# Patient Record
Sex: FEMALE | Race: WHITE | URBAN - METROPOLITAN AREA
[De-identification: names, ages, dates, MRNs, and addresses within clinical notes are randomized per-mention and may not be internally consistent; named-entity substitution may affect disease eponyms.]

---

## 2022-12-12 ENCOUNTER — OFFICE VISIT (OUTPATIENT)
Dept: URGENT CARE | Facility: CLINIC | Age: 52
End: 2022-12-12
Payer: MEDICAID

## 2022-12-12 VITALS
HEART RATE: 87 BPM | DIASTOLIC BLOOD PRESSURE: 76 MMHG | SYSTOLIC BLOOD PRESSURE: 110 MMHG | TEMPERATURE: 98 F | HEIGHT: 63 IN | RESPIRATION RATE: 18 BRPM | BODY MASS INDEX: 33.66 KG/M2 | WEIGHT: 190 LBS | OXYGEN SATURATION: 96 %

## 2022-12-12 DIAGNOSIS — R05.9 COUGH, UNSPECIFIED TYPE: ICD-10-CM

## 2022-12-12 DIAGNOSIS — J20.9 ACUTE BRONCHITIS, UNSPECIFIED ORGANISM: Primary | ICD-10-CM

## 2022-12-12 PROCEDURE — 99203 OFFICE O/P NEW LOW 30 MIN: CPT | Mod: S$GLB,,,

## 2022-12-12 PROCEDURE — 1159F PR MEDICATION LIST DOCUMENTED IN MEDICAL RECORD: ICD-10-PCS | Mod: CPTII,S$GLB,,

## 2022-12-12 PROCEDURE — 1160F PR REVIEW ALL MEDS BY PRESCRIBER/CLIN PHARMACIST DOCUMENTED: ICD-10-PCS | Mod: CPTII,S$GLB,,

## 2022-12-12 PROCEDURE — 71046 X-RAY EXAM CHEST 2 VIEWS: CPT | Mod: S$GLB,,, | Performed by: RADIOLOGY

## 2022-12-12 PROCEDURE — 3074F PR MOST RECENT SYSTOLIC BLOOD PRESSURE < 130 MM HG: ICD-10-PCS | Mod: CPTII,S$GLB,,

## 2022-12-12 PROCEDURE — 3078F PR MOST RECENT DIASTOLIC BLOOD PRESSURE < 80 MM HG: ICD-10-PCS | Mod: CPTII,S$GLB,,

## 2022-12-12 PROCEDURE — 1160F RVW MEDS BY RX/DR IN RCRD: CPT | Mod: CPTII,S$GLB,,

## 2022-12-12 PROCEDURE — 3078F DIAST BP <80 MM HG: CPT | Mod: CPTII,S$GLB,,

## 2022-12-12 PROCEDURE — 71046 XR CHEST PA AND LATERAL: ICD-10-PCS | Mod: S$GLB,,, | Performed by: RADIOLOGY

## 2022-12-12 PROCEDURE — 3074F SYST BP LT 130 MM HG: CPT | Mod: CPTII,S$GLB,,

## 2022-12-12 PROCEDURE — 99203 PR OFFICE/OUTPT VISIT, NEW, LEVL III, 30-44 MIN: ICD-10-PCS | Mod: S$GLB,,,

## 2022-12-12 PROCEDURE — 3008F BODY MASS INDEX DOCD: CPT | Mod: CPTII,S$GLB,,

## 2022-12-12 PROCEDURE — 1159F MED LIST DOCD IN RCRD: CPT | Mod: CPTII,S$GLB,,

## 2022-12-12 PROCEDURE — 3008F PR BODY MASS INDEX (BMI) DOCUMENTED: ICD-10-PCS | Mod: CPTII,S$GLB,,

## 2022-12-12 RX ORDER — AZITHROMYCIN 250 MG/1
TABLET, FILM COATED ORAL
Qty: 6 TABLET | Refills: 0 | Status: SHIPPED | OUTPATIENT
Start: 2022-12-12 | End: 2022-12-17

## 2022-12-12 RX ORDER — BENZONATATE 100 MG/1
100 CAPSULE ORAL 3 TIMES DAILY PRN
Qty: 30 CAPSULE | Refills: 0 | Status: SHIPPED | OUTPATIENT
Start: 2022-12-12 | End: 2022-12-22

## 2022-12-12 RX ORDER — PREDNISONE 20 MG/1
20 TABLET ORAL DAILY
Qty: 4 TABLET | Refills: 0 | Status: SHIPPED | OUTPATIENT
Start: 2022-12-12 | End: 2022-12-16

## 2022-12-12 NOTE — PROGRESS NOTES
"Subjective:       Patient ID: Danyelle Garcia is a 52 y.o. female.    Vitals:  height is 5' 3" (1.6 m) and weight is 86.2 kg (190 lb). Her tympanic temperature is 97.8 °F (36.6 °C). Her blood pressure is 110/76 and her pulse is 87. Her respiration is 18 and oxygen saturation is 96%.     Chief Complaint: Cough    52-year-old female presents complains of a dry cough, congestion that has been going on since November 28.  Patient states that she was on a trip in [place] from November 24, 2028 and after returning she began having a dry cough.  She is been taking NyQuil, drinking tea and taking over-the-counter cough suppressants without any significant relief.  No chest pain, shortness a breath, fever, chills, nausea or vomiting    Cough  This is a new problem. The current episode started 1 to 4 weeks ago. The problem has been unchanged. The problem occurs every few minutes. The cough is Non-productive. Pertinent negatives include no chest pain, chills, fever, sore throat or shortness of breath. Nothing aggravates the symptoms. She has tried OTC cough suppressant for the symptoms. The treatment provided mild relief. There is no history of asthma, bronchiectasis, bronchitis, COPD, emphysema, environmental allergies or pneumonia.     Constitution: Negative for chills, sweating, fatigue and fever.   HENT:  Positive for congestion. Negative for sinus pain, sinus pressure and sore throat.    Cardiovascular:  Negative for chest pain and sob on exertion.   Respiratory:  Positive for cough. Negative for sputum production and shortness of breath.    Allergic/Immunologic: Negative for environmental allergies.     Objective:      Physical Exam   Constitutional: She is oriented to person, place, and time. She appears well-developed. She is cooperative.  Non-toxic appearance. She does not appear ill. No distress.   HENT:   Head: Normocephalic and atraumatic.   Ears:   Right Ear: Hearing, tympanic membrane, external ear and ear canal " normal.   Left Ear: Hearing, tympanic membrane, external ear and ear canal normal.   Nose: Nose normal. No mucosal edema, rhinorrhea or nasal deformity. No epistaxis. Right sinus exhibits no maxillary sinus tenderness and no frontal sinus tenderness. Left sinus exhibits no maxillary sinus tenderness and no frontal sinus tenderness.   Mouth/Throat: Uvula is midline, oropharynx is clear and moist and mucous membranes are normal. No trismus in the jaw. Normal dentition. No uvula swelling. No posterior oropharyngeal erythema.   Eyes: Conjunctivae, EOM and lids are normal. Pupils are equal, round, and reactive to light. Right eye exhibits no discharge. Left eye exhibits no discharge. No scleral icterus.   Neck: Trachea normal and phonation normal. Neck supple.   Cardiovascular: Normal rate, regular rhythm, normal heart sounds and normal pulses.   Pulmonary/Chest: Effort normal and breath sounds normal. No respiratory distress.   Abdominal: Normal appearance and bowel sounds are normal. She exhibits no distension and no mass. Soft. There is no abdominal tenderness.   Musculoskeletal: Normal range of motion.         General: No deformity. Normal range of motion.   Neurological: She is alert and oriented to person, place, and time. She exhibits normal muscle tone. Coordination normal.   Skin: Skin is warm, dry, intact, not diaphoretic and not pale.   Psychiatric: Her speech is normal and behavior is normal. Judgment and thought content normal.   Nursing note and vitals reviewed.      Native chest x-ray personally read by me.  Patient discharged prior to final chest x-ray read.    Assessment:       1. Acute bronchitis, unspecified organism    2. Cough, unspecified type          Plan:       Discussed negative chest xray results with patient. No testing done today as symptoms have been present for 7 days. Discussed diagnosis of bronchitis with patient as a cough for 7 days or more in the absence of wheezing, fever and chest  xray showing evidence of pneumonia. Will treat symptoms with prednisone for inflammation, cough suppressants, anti histamine and flonase. Also prescribed zpack. Patient should continue taking mucinex. Return to clinic with any new or worsening symptoms. Patient's vitals are stable, she is in no acute distress.     Acute bronchitis, unspecified organism  -     azithromycin (Z-DELMI) 250 MG tablet; Take 2 tablets by mouth on day 1; Take 1 tablet by mouth on days 2-5  Dispense: 6 tablet; Refill: 0  -     predniSONE (DELTASONE) 20 MG tablet; Take 1 tablet (20 mg total) by mouth once daily. for 4 days  Dispense: 4 tablet; Refill: 0  -     benzonatate (TESSALON) 100 MG capsule; Take 1 capsule (100 mg total) by mouth 3 (three) times daily as needed for Cough.  Dispense: 30 capsule; Refill: 0    Cough, unspecified type  -     XR CHEST PA AND LATERAL; Future; Expected date: 12/12/2022

## 2023-02-16 ENCOUNTER — OFFICE VISIT (OUTPATIENT)
Dept: URGENT CARE | Facility: CLINIC | Age: 53
End: 2023-02-16
Payer: MEDICAID

## 2023-02-16 VITALS
OXYGEN SATURATION: 97 % | BODY MASS INDEX: 32.78 KG/M2 | SYSTOLIC BLOOD PRESSURE: 121 MMHG | DIASTOLIC BLOOD PRESSURE: 82 MMHG | HEART RATE: 86 BPM | HEIGHT: 63 IN | RESPIRATION RATE: 16 BRPM | WEIGHT: 185 LBS | TEMPERATURE: 100 F

## 2023-02-16 DIAGNOSIS — U07.1 COVID-19 VIRUS INFECTION: Primary | ICD-10-CM

## 2023-02-16 DIAGNOSIS — R05.9 COUGH, UNSPECIFIED TYPE: ICD-10-CM

## 2023-02-16 LAB
CTP QC/QA: YES
SARS-COV-2 AG RESP QL IA.RAPID: POSITIVE

## 2023-02-16 PROCEDURE — 3008F BODY MASS INDEX DOCD: CPT | Mod: CPTII,S$GLB,, | Performed by: EMERGENCY MEDICINE

## 2023-02-16 PROCEDURE — 1159F PR MEDICATION LIST DOCUMENTED IN MEDICAL RECORD: ICD-10-PCS | Mod: CPTII,S$GLB,, | Performed by: EMERGENCY MEDICINE

## 2023-02-16 PROCEDURE — 3074F PR MOST RECENT SYSTOLIC BLOOD PRESSURE < 130 MM HG: ICD-10-PCS | Mod: CPTII,S$GLB,, | Performed by: EMERGENCY MEDICINE

## 2023-02-16 PROCEDURE — 87811 SARS CORONAVIRUS 2 ANTIGEN POCT, MANUAL READ: ICD-10-PCS | Mod: QW,S$GLB,, | Performed by: EMERGENCY MEDICINE

## 2023-02-16 PROCEDURE — 3008F PR BODY MASS INDEX (BMI) DOCUMENTED: ICD-10-PCS | Mod: CPTII,S$GLB,, | Performed by: EMERGENCY MEDICINE

## 2023-02-16 PROCEDURE — 3074F SYST BP LT 130 MM HG: CPT | Mod: CPTII,S$GLB,, | Performed by: EMERGENCY MEDICINE

## 2023-02-16 PROCEDURE — 3079F DIAST BP 80-89 MM HG: CPT | Mod: CPTII,S$GLB,, | Performed by: EMERGENCY MEDICINE

## 2023-02-16 PROCEDURE — 99214 PR OFFICE/OUTPT VISIT, EST, LEVL IV, 30-39 MIN: ICD-10-PCS | Mod: CR,S$GLB,, | Performed by: EMERGENCY MEDICINE

## 2023-02-16 PROCEDURE — 87811 SARS-COV-2 COVID19 W/OPTIC: CPT | Mod: QW,S$GLB,, | Performed by: EMERGENCY MEDICINE

## 2023-02-16 PROCEDURE — 3079F PR MOST RECENT DIASTOLIC BLOOD PRESSURE 80-89 MM HG: ICD-10-PCS | Mod: CPTII,S$GLB,, | Performed by: EMERGENCY MEDICINE

## 2023-02-16 PROCEDURE — 1159F MED LIST DOCD IN RCRD: CPT | Mod: CPTII,S$GLB,, | Performed by: EMERGENCY MEDICINE

## 2023-02-16 PROCEDURE — 99214 OFFICE O/P EST MOD 30 MIN: CPT | Mod: CR,S$GLB,, | Performed by: EMERGENCY MEDICINE

## 2023-02-16 RX ORDER — BROMPHENIRAMINE MALEATE, PSEUDOEPHEDRINE HYDROCHLORIDE, AND DEXTROMETHORPHAN HYDROBROMIDE 2; 30; 10 MG/5ML; MG/5ML; MG/5ML
10 SYRUP ORAL EVERY 6 HOURS PRN
Qty: 200 ML | Refills: 0 | Status: SHIPPED | OUTPATIENT
Start: 2023-02-16 | End: 2023-02-26

## 2023-02-16 NOTE — PROGRESS NOTES
"Subjective:       Patient ID: Danyelle Garcia is a 52 y.o. female.    Vitals:  height is 5' 3" (1.6 m) and weight is 83.9 kg (185 lb). Her tympanic temperature is 99.5 °F (37.5 °C). Her blood pressure is 121/82 and her pulse is 86. Her respiration is 16 and oxygen saturation is 97%.     Chief Complaint: Cough    Pt is here today for cough and headache X 2 days  Pt is taking Tylenol and Nyquil    PATIENT IS A 52-YEAR-OLD FEMALE WITH NO SIGNIFICANT PAST MEDICAL HISTORY AND IS NOT ON MEDICATIONS BY PRESCRIPTION WITH 2 DAYS ONSET OF HEADACHE, FEVERS CHILLS, CONGESTION AND RESOLVED SORE THROAT.  SHE DOES REPORT A COUGH WITHOUT SHORTNESS OF BREATH.  EXAM SHOWS NORMAL VITAL SIGNS WITH NO WHEEZING NO INCREASED WORK OF BREATHING.  COVID SWAB POSITIVE.  DISCUSSED WITH HER SUPPORTIVE CARE INCLUDING FLUIDS, TYLENOL IBUPROFEN SYMPTOMATIC RELIEF OF CONGESTION RUNNY NOSE POSTNASAL DRIP AND COUGH WITH BROMFED DM PRESCRIPTION AND HER OPTIONS WITH PAXLOVID.  SHE HAS REQUESTED THE PAXLOVID AND WILL PRESCRIBE.    Cough  The current episode started in the past 7 days. The problem has been unchanged. The problem occurs constantly. The cough is Productive of sputum. Associated symptoms include headaches. Pertinent negatives include no chest pain, chills, ear pain, fever, rash, rhinorrhea, sore throat or shortness of breath. Nothing aggravates the symptoms. Treatments tried: Tylenol.     ROS    Constitution: Negative for chills, fatigue and fever.   HENT:  Negative for ear pain, sinus pain and sore throat.    Neck: Negative for neck pain and neck stiffness.   Cardiovascular:  Negative for chest pain, palpitations and sob on exertion.   Eyes:  Negative for eye pain and vision loss.   Respiratory:  Positive for cough. Negative for shortness of breath and asthma.    Gastrointestinal:  Negative for abdominal pain, nausea, vomiting and diarrhea.   Genitourinary:  Negative for dysuria, frequency and hematuria.   Musculoskeletal:  Negative for " pain, abnormal ROM of joint and back pain.   Skin:  Negative for rash and wound.   Allergic/Immunologic: Negative for seasonal allergies and asthma.   Neurological:  Positive for headaches. Negative for dizziness, light-headedness and altered mental status.   Psychiatric/Behavioral:  Negative for altered mental status and confusion.      Objective:      Physical Exam   Constitutional: She is oriented to person, place, and time. She appears well-developed. She is cooperative.  Non-toxic appearance. She does not appear ill. No distress.   HENT:   Head: Normocephalic and atraumatic.   Ears:   Right Ear: Hearing, tympanic membrane, external ear and ear canal normal.   Left Ear: Hearing, tympanic membrane, external ear and ear canal normal.   Nose: Congestion present. No mucosal edema, rhinorrhea or nasal deformity. No epistaxis. Right sinus exhibits no maxillary sinus tenderness and no frontal sinus tenderness. Left sinus exhibits no maxillary sinus tenderness and no frontal sinus tenderness.   Mouth/Throat: Uvula is midline, oropharynx is clear and moist and mucous membranes are normal. No trismus in the jaw. Normal dentition. No uvula swelling. No oropharyngeal exudate, posterior oropharyngeal edema or posterior oropharyngeal erythema.   Eyes: Conjunctivae and lids are normal. No scleral icterus.   Neck: Trachea normal and phonation normal. Neck supple. No edema present. No erythema present. No neck rigidity present.   Cardiovascular: Normal rate, regular rhythm, normal heart sounds and normal pulses.   Pulmonary/Chest: Effort normal and breath sounds normal. No respiratory distress. She has no decreased breath sounds. She has no rhonchi.   Abdominal: Normal appearance.   Musculoskeletal: Normal range of motion.         General: No deformity. Normal range of motion.   Neurological: She is alert and oriented to person, place, and time. She exhibits normal muscle tone. Coordination normal.   Skin: Skin is warm, dry,  intact, not diaphoretic and not pale.   Psychiatric: Her speech is normal and behavior is normal. Judgment and thought content normal.   Nursing note and vitals reviewed.       Results for orders placed or performed in visit on 02/16/23   SARS Coronavirus 2 Antigen, POCT Manual Read   Result Value Ref Range    SARS Coronavirus 2 Antigen Positive (A) Negative     Acceptable Yes          Assessment:       1. COVID-19 virus infection    2. Cough, unspecified type          Plan:         COVID-19 virus infection    Cough, unspecified type  -     SARS Coronavirus 2 Antigen, POCT Manual Read    Other orders  -     nirmatrelvir-ritonavir 300 mg (150 mg x 2)-100 mg copackaged tablets (EUA); Take 3 tablets by mouth 2 (two) times daily for 5 days. Each dose contains 2 nirmatrelvir (pink tablets) and 1 ritonavir (white tablet). Take all 3 tablets together  Dispense: 30 tablet; Refill: 0  -     brompheniramine-pseudoeph-DM (BROMFED DM) 2-30-10 mg/5 mL Syrp; Take 10 mLs by mouth every 6 (six) hours as needed (CONGESTION/COUGH).  Dispense: 200 mL; Refill: 0         Patient Instructions   Paxlovid prescription   Bromfed DM prescription   Rest and hydrate with plenty of fluids  Tylenol and ibuprofen for any fever body aches or headache  COVID swab positive   See COVID discharge instruction sheet    Return for any concerns or problems including shortness of breath, high fevers that does not come down with Tylenol or ibuprofen, worsening of current condition despite treatment regimen.      Follow-up with PCP

## 2023-02-16 NOTE — PATIENT INSTRUCTIONS
Paxlovid prescription   Bromfed DM prescription   Rest and hydrate with plenty of fluids  Tylenol and ibuprofen for any fever body aches or headache  COVID swab positive   See COVID discharge instruction sheet    Return for any concerns or problems including shortness of breath, high fevers that does not come down with Tylenol or ibuprofen, worsening of current condition despite treatment regimen.      Follow-up with PCP

## 2023-02-16 NOTE — LETTER
[address], [address]  [place] [address]  Phone: [phone]  Fax: [phone]          Return to Work/School    Patient: Danyelle Garcia  YOB: 1970   Date: 02/16/2023     To Whom It May Concern:     Danyelle Garcia was in contact with/seen in my office on 02/16/2023. COVID-19 is present in our communities across the state. There is limited testing for COVID at this time, so not all patients can be tested. In this situation, your employee meets the following criteria:     Danyelle Garcia has met the criteria for COVID-19 testing and has a POSITIVE result. She can return to work once they are asymptomatic for 24 hours without the use of fever reducing medications AND at least five days from the start of symptoms (or from the first positive result if they have no symptoms).      If you have any questions or concerns, or if I can be of further assistance, please do not hesitate to contact me.     Sincerely,      Linden Cyr MD

## 2023-03-06 ENCOUNTER — OFFICE VISIT (OUTPATIENT)
Dept: URGENT CARE | Facility: CLINIC | Age: 53
End: 2023-03-06
Payer: MEDICAID

## 2023-03-06 VITALS
HEIGHT: 63 IN | HEART RATE: 96 BPM | BODY MASS INDEX: 32.78 KG/M2 | RESPIRATION RATE: 16 BRPM | DIASTOLIC BLOOD PRESSURE: 85 MMHG | SYSTOLIC BLOOD PRESSURE: 126 MMHG | WEIGHT: 185 LBS | OXYGEN SATURATION: 98 % | TEMPERATURE: 99 F

## 2023-03-06 DIAGNOSIS — M77.32 CALCANEAL SPUR OF LEFT FOOT: ICD-10-CM

## 2023-03-06 DIAGNOSIS — Z76.89 ENCOUNTER TO ESTABLISH CARE: ICD-10-CM

## 2023-03-06 DIAGNOSIS — S93.402A MODERATE LEFT ANKLE SPRAIN, INITIAL ENCOUNTER: Primary | ICD-10-CM

## 2023-03-06 LAB
B-HCG UR QL: NEGATIVE
CTP QC/QA: YES

## 2023-03-06 PROCEDURE — 81025 URINE PREGNANCY TEST: CPT | Mod: S$GLB,,, | Performed by: PHYSICIAN ASSISTANT

## 2023-03-06 PROCEDURE — 1160F PR REVIEW ALL MEDS BY PRESCRIBER/CLIN PHARMACIST DOCUMENTED: ICD-10-PCS | Mod: CPTII,S$GLB,, | Performed by: PHYSICIAN ASSISTANT

## 2023-03-06 PROCEDURE — 1159F MED LIST DOCD IN RCRD: CPT | Mod: CPTII,S$GLB,, | Performed by: PHYSICIAN ASSISTANT

## 2023-03-06 PROCEDURE — 3008F BODY MASS INDEX DOCD: CPT | Mod: CPTII,S$GLB,, | Performed by: PHYSICIAN ASSISTANT

## 2023-03-06 PROCEDURE — 3079F DIAST BP 80-89 MM HG: CPT | Mod: CPTII,S$GLB,, | Performed by: PHYSICIAN ASSISTANT

## 2023-03-06 PROCEDURE — 81025 POCT URINE PREGNANCY: ICD-10-PCS | Mod: S$GLB,,, | Performed by: PHYSICIAN ASSISTANT

## 2023-03-06 PROCEDURE — 73610 XR ANKLE COMPLETE 3 VIEW LEFT: ICD-10-PCS | Mod: LT,S$GLB,, | Performed by: RADIOLOGY

## 2023-03-06 PROCEDURE — 1160F RVW MEDS BY RX/DR IN RCRD: CPT | Mod: CPTII,S$GLB,, | Performed by: PHYSICIAN ASSISTANT

## 2023-03-06 PROCEDURE — 73610 X-RAY EXAM OF ANKLE: CPT | Mod: LT,S$GLB,, | Performed by: RADIOLOGY

## 2023-03-06 PROCEDURE — 3074F PR MOST RECENT SYSTOLIC BLOOD PRESSURE < 130 MM HG: ICD-10-PCS | Mod: CPTII,S$GLB,, | Performed by: PHYSICIAN ASSISTANT

## 2023-03-06 PROCEDURE — 3074F SYST BP LT 130 MM HG: CPT | Mod: CPTII,S$GLB,, | Performed by: PHYSICIAN ASSISTANT

## 2023-03-06 PROCEDURE — 1159F PR MEDICATION LIST DOCUMENTED IN MEDICAL RECORD: ICD-10-PCS | Mod: CPTII,S$GLB,, | Performed by: PHYSICIAN ASSISTANT

## 2023-03-06 PROCEDURE — 99213 OFFICE O/P EST LOW 20 MIN: CPT | Mod: S$GLB,,, | Performed by: PHYSICIAN ASSISTANT

## 2023-03-06 PROCEDURE — 99213 PR OFFICE/OUTPT VISIT, EST, LEVL III, 20-29 MIN: ICD-10-PCS | Mod: S$GLB,,, | Performed by: PHYSICIAN ASSISTANT

## 2023-03-06 PROCEDURE — 3079F PR MOST RECENT DIASTOLIC BLOOD PRESSURE 80-89 MM HG: ICD-10-PCS | Mod: CPTII,S$GLB,, | Performed by: PHYSICIAN ASSISTANT

## 2023-03-06 PROCEDURE — 3008F PR BODY MASS INDEX (BMI) DOCUMENTED: ICD-10-PCS | Mod: CPTII,S$GLB,, | Performed by: PHYSICIAN ASSISTANT

## 2023-03-06 NOTE — PATIENT INSTRUCTIONS
Compression with Ace bandage as tolerated.  Elevation of leg.  Ice for 15 minutes at a time 3 to 4 times a day rotate Advil and Tylenol for pain relief.  We will call with the official x-ray results.  This can take several weeks to heal however if symptoms worsen please got to the ER for evaluation. A referral was placed for primary care someone should contact you with information.  If you do not hear from them within a week please call [phone].

## 2023-03-06 NOTE — PROGRESS NOTES
"Subjective:       Patient ID: Danyelle Garcia is a 52 y.o. female.    Vitals:  height is 5' 3" (1.6 m) and weight is 83.9 kg (185 lb). Her temperature is 99.1 °F (37.3 °C). Her blood pressure is 126/85 and her pulse is 96. Her respiration is 16 and oxygen saturation is 98%.     Chief Complaint: Ankle Pain    Pt sts she was walking yesterday and stepped in a hole in the grass and and fell and thinks she twisted her L ankle in the process. Pt sts there is pain and swelling. Pt has taken tylenol with significant relief.     Ankle Pain   The incident occurred 12 to 24 hours ago. The incident occurred in the yard. The injury mechanism was a fall. The pain is at a severity of 7/10. The pain has been Constant since onset. Pertinent negatives include no inability to bear weight, loss of motion, loss of sensation, muscle weakness, numbness or tingling. She reports no foreign bodies present. The symptoms are aggravated by weight bearing. She has tried acetaminophen for the symptoms. The treatment provided significant relief.     Constitution: Negative for appetite change, chills, sweating, fatigue and fever.   Neck: Negative for painful lymph nodes.   Cardiovascular:  Negative for chest pain.   Respiratory:  Negative for shortness of breath.    Gastrointestinal:  Negative for abdominal pain.   Musculoskeletal:  Positive for pain, trauma, joint pain, joint swelling, abnormal ROM of joint and muscle ache. Negative for muscle cramps.   Skin:  Negative for color change, pale, rash and erythema.   Neurological:  Negative for disorientation, altered mental status, numbness, tingling and tremors.   Hematologic/Lymphatic: Negative for swollen lymph nodes.   Psychiatric/Behavioral:  Negative for altered mental status, disorientation, confusion and agitation.    History reviewed. No pertinent past medical history.    History reviewed. No pertinent surgical history.    History reviewed. No pertinent family history.    Social History "     Socioeconomic History    Marital status: Unknown   Tobacco Use    Smoking status: Never    Smokeless tobacco: Never       No current outpatient medications on file.     No current facility-administered medications for this visit.       Review of patient's allergies indicates:  No Known Allergies      Objective:      Physical Exam   Constitutional: She is oriented to person, place, and time.  Non-toxic appearance. She does not appear ill. No distress.   HENT:   Head: Normocephalic and atraumatic.   Cardiovascular: Normal pulses.   Pulses:       Dorsalis pedis pulses are 2+ on the left side.        Posterior tibial pulses are 2+ on the left side.   Pulmonary/Chest: Effort normal. No respiratory distress.   Abdominal: Normal appearance.   Musculoskeletal:         General: Swelling, tenderness and signs of injury present. No deformity.      Right lower leg: No edema.      Left lower leg: Edema present.      Left foot: Decreased range of motion. No deformity. There is effusion present. No bruising, atrophy or scars present. Anterior drawer test: negative. Varus tilt test: positive. Plantar foot sensation: normal.        Feet:    Neurological: She is alert and oriented to person, place, and time. No sensory deficit. Gait abnormal. Coordination normal.   Skin: Skin is warm, dry, not diaphoretic, not pale and no rash. Capillary refill takes less than 2 seconds. No bruising and No erythema   Psychiatric: Her behavior is normal. Mood, judgment and thought content normal.   Nursing note and vitals reviewed.    XR ANKLE COMPLETE 3 VIEW LEFT    Result Date: 3/6/2023  EXAMINATION: XR ANKLE COMPLETE 3 VIEW LEFT CLINICAL HISTORY: Pain in left ankle and joints of left foot TECHNIQUE: AP, lateral and oblique views of the left ankle were performed. COMPARISON: None FINDINGS: The bones are intact.  There is no evidence for acute fracture or bone destruction.  There is no evidence for dislocation.  There is soft tissue swelling  overlying the left lateral malleolus.  Calcaneal spurs are noted at the insertions of the Achilles tendon and plantar fascia.  No radiopaque soft tissue foreign bodies are identified.     No evidence for acute fracture, bone destruction, or dislocation. Soft tissue swelling particularly overlying the lateral malleolus. Calcaneal spurs at the insertions of the Achilles tendon and plantar fascia. Electronically signed by: Wai Luevano MD Date:    03/06/2023 Time:    16:47     Assessment:       1. Moderate left ankle sprain, initial encounter    2. Encounter to establish care    3. Calcaneal spur of left foot          Plan:     Results reviewed- pt notified    Moderate left ankle sprain, initial encounter  -     XR ANKLE COMPLETE 3 VIEW LEFT  -     POCT urine pregnancy  -     BANDAGE ELASTIC 4IN ACE NS    Encounter to establish care  -     Ambulatory referral/consult to Internal Medicine    Calcaneal spur of left foot    - no symptoms at this time pt made aware of signs and symptoms to be aware of.                Patient Instructions   Compression with Ace bandage as tolerated.  Elevation of leg.  Ice for 15 minutes at a time 3 to 4 times a day rotate Advil and Tylenol for pain relief.  We will call with the official x-ray results.  This can take several weeks to heal however if symptoms worsen please got to the ER for evaluation. A referral was placed for primary care someone should contact you with information.  If you do not hear from them within a week please call [phone].

## 2023-04-04 ENCOUNTER — OFFICE VISIT (OUTPATIENT)
Dept: URGENT CARE | Facility: CLINIC | Age: 53
End: 2023-04-04
Payer: MEDICAID

## 2023-04-04 VITALS
BODY MASS INDEX: 32.78 KG/M2 | SYSTOLIC BLOOD PRESSURE: 114 MMHG | TEMPERATURE: 98 F | RESPIRATION RATE: 18 BRPM | OXYGEN SATURATION: 95 % | HEART RATE: 83 BPM | DIASTOLIC BLOOD PRESSURE: 73 MMHG | HEIGHT: 63 IN | WEIGHT: 185 LBS

## 2023-04-04 DIAGNOSIS — M77.12 LEFT LATERAL EPICONDYLITIS: Primary | ICD-10-CM

## 2023-04-04 DIAGNOSIS — M25.522 ELBOW PAIN, LEFT: ICD-10-CM

## 2023-04-04 PROCEDURE — 73080 X-RAY EXAM OF ELBOW: CPT | Mod: LT,S$GLB,, | Performed by: RADIOLOGY

## 2023-04-04 PROCEDURE — 99214 OFFICE O/P EST MOD 30 MIN: CPT | Mod: S$GLB,,, | Performed by: EMERGENCY MEDICINE

## 2023-04-04 PROCEDURE — 99214 PR OFFICE/OUTPT VISIT, EST, LEVL IV, 30-39 MIN: ICD-10-PCS | Mod: S$GLB,,, | Performed by: EMERGENCY MEDICINE

## 2023-04-04 PROCEDURE — 73080 XR ELBOW COMPLETE 3 VIEW LEFT: ICD-10-PCS | Mod: LT,S$GLB,, | Performed by: RADIOLOGY

## 2023-04-04 RX ORDER — METHYLPREDNISOLONE 4 MG/1
TABLET ORAL
Qty: 21 EACH | Refills: 0 | Status: SHIPPED | OUTPATIENT
Start: 2023-04-04 | End: 2023-04-25

## 2023-04-04 NOTE — PATIENT INSTRUCTIONS
Tennis Elbow Brace from the [facility]  Xray negative for bony injury  Medrol dose pack Rx  Rest and Ice sore area  See lateral Epicondylitis sheet  Follow up with PCP/Orthopedics if not very much improved in 1-2 weeks.  After Medrol dose pack Rx finished, ok to take tylenol and/or advil.  Return for any concerns or problems

## 2023-04-04 NOTE — PROGRESS NOTES
"Subjective:      Patient ID: Danyelle Garcia is a 52 y.o. female.    Vitals:  height is 5' 3" (1.6 m) and weight is 83.9 kg (185 lb). Her tympanic temperature is 97.8 °F (36.6 °C). Her blood pressure is 114/73 and her pulse is 83. Her respiration is 18 and oxygen saturation is 95%.     Chief Complaint: Elbow Pain    Pt complains of left elbow that occurred from a injury that happened 1 month ago. Pain is described as aching during certain movements and positions. Tylenol was taken for treatment with mild relief.    Patient is a 52-year-old female who sustained a fall 1 month ago and had a moderate injury to the left ankle which has since resolved at that time she did state that she hit her left elbow.  Since then she is been having intermittent pain to the left lateral elbow.  Worse with pronation and supination and palpation of the lateral epicondyle.  She started to work out and states it did not improve.  Physical exam shows symptoms and signs of lateral epicondylitis and will treat as such.  X-ray negative.  Encouraged rest, ice, elevation, tennis elbow brace and will prescribe Medrol Dosepak followed by NSAID or Tylenol.  Encouraged her to follow up with the primary care physician or Orthopedics if not significantly improved or resolved.      Elbow Injury  This is a new problem. The current episode started more than 1 month ago. The problem occurs constantly. The problem has been unchanged. Associated symptoms include arthralgias. Pertinent negatives include no abdominal pain, chest pain, chills, coughing, fatigue, fever, joint swelling, nausea, neck pain, numbness, rash, sore throat or vomiting. The symptoms are aggravated by bending and twisting. She has tried acetaminophen for the symptoms. The treatment provided mild relief.     ROS    Constitution: Negative for chills, fatigue and fever.   HENT:  Negative for ear pain, sinus pain and sore throat.    Neck: Negative for neck pain and neck stiffness. "   Cardiovascular:  Negative for chest pain, palpitations and sob on exertion.   Eyes:  Negative for eye pain and vision loss.   Respiratory:  Negative for cough, shortness of breath and asthma.    Gastrointestinal:  Negative for abdominal pain, nausea, vomiting and diarrhea.   Genitourinary:  Negative for dysuria, frequency and hematuria.   Musculoskeletal:  Positive for trauma and joint pain. Negative for pain, joint swelling, abnormal ROM of joint and back pain.   Skin:  Negative for rash, wound and erythema.   Allergic/Immunologic: Negative for seasonal allergies and asthma.   Neurological:  Negative for dizziness, light-headedness, altered mental status, numbness and tingling.   Psychiatric/Behavioral:  Negative for altered mental status and confusion.     Objective:     Physical Exam   Constitutional: She is oriented to person, place, and time. She appears well-developed.   HENT:   Head: Normocephalic and atraumatic. Head is without abrasion, without contusion and without laceration.   Ears:   Right Ear: External ear normal.   Left Ear: External ear normal.   Nose: Nose normal.   Mouth/Throat: Oropharynx is clear and moist and mucous membranes are normal.   Eyes: Conjunctivae, EOM and lids are normal. Pupils are equal, round, and reactive to light.   Neck: Trachea normal and phonation normal. Neck supple.   Cardiovascular: Normal rate, regular rhythm and normal heart sounds.   Pulmonary/Chest: Effort normal and breath sounds normal. No stridor. No respiratory distress.   Musculoskeletal:         General: Tenderness and signs of injury present. No swelling.      Comments: LEFT ELBOW: PAIN WITH PRONATION AND SUPINATION AT THE LATERAL EPICONDYLE.  NO SWELLING NO BRUISING NO EDEMA.  DISTALLY NEUROVASCULARLY INTACT.  CONSISTENT WITH LATERAL EPICONDYLITIS OR TENNIS ELBOW.   Neurological: She is alert and oriented to person, place, and time.   Skin: Skin is warm, dry, intact and no rash. Capillary refill takes less  than 2 seconds. No abrasion, No burn, No bruising, No erythema and No ecchymosis   Psychiatric: Her speech is normal and behavior is normal. Judgment and thought content normal.   Nursing note and vitals reviewed.       XRAY LEFT ELBOW NEGATIVE     Assessment:     1. Left lateral epicondylitis    2. Elbow pain, left        Plan:       Left lateral epicondylitis  -     X-Ray Elbow Complete Left; Future; Expected date: 04/04/2023    Elbow pain, left    Other orders  -     methylPREDNISolone (MEDROL DOSEPACK) 4 mg tablet; use as directed  Dispense: 21 each; Refill: 0      Patient Instructions   Tennis Elbow Brace from the drugstore  Xray negative for bony injury  Medrol dose pack Rx  Rest and Ice sore area  See lateral Epicondylitis sheet  Follow up with PCP/Orthopedics if not very much improved in 1-2 weeks.  After Medrol dose pack Rx finished, ok to take tylenol and/or advil.  Return for any concerns or problems

## 2023-05-21 ENCOUNTER — OFFICE VISIT (OUTPATIENT)
Dept: URGENT CARE | Facility: CLINIC | Age: 53
End: 2023-05-21
Payer: COMMERCIAL

## 2023-05-21 VITALS
BODY MASS INDEX: 32.78 KG/M2 | RESPIRATION RATE: 19 BRPM | HEART RATE: 77 BPM | WEIGHT: 185 LBS | OXYGEN SATURATION: 97 % | DIASTOLIC BLOOD PRESSURE: 80 MMHG | TEMPERATURE: 98 F | HEIGHT: 63 IN | SYSTOLIC BLOOD PRESSURE: 122 MMHG

## 2023-05-21 DIAGNOSIS — H60.312 ACUTE DIFFUSE OTITIS EXTERNA OF LEFT EAR: Primary | ICD-10-CM

## 2023-05-21 DIAGNOSIS — H92.02 LEFT EAR PAIN: ICD-10-CM

## 2023-05-21 PROBLEM — R07.0 PAIN IN THROAT: Status: ACTIVE | Noted: 2020-02-21

## 2023-05-21 PROBLEM — J01.90 ACUTE SINUSITIS: Status: ACTIVE | Noted: 2020-02-21

## 2023-05-21 PROCEDURE — 99213 OFFICE O/P EST LOW 20 MIN: CPT | Mod: S$GLB,,, | Performed by: FAMILY MEDICINE

## 2023-05-21 PROCEDURE — 99213 PR OFFICE/OUTPT VISIT, EST, LEVL III, 20-29 MIN: ICD-10-PCS | Mod: S$GLB,,, | Performed by: FAMILY MEDICINE

## 2023-05-21 RX ORDER — IBUPROFEN 600 MG/1
600 TABLET ORAL EVERY 8 HOURS PRN
Qty: 30 TABLET | Refills: 0 | Status: SHIPPED | OUTPATIENT
Start: 2023-05-21 | End: 2023-05-31

## 2023-05-21 RX ORDER — AMOXICILLIN AND CLAVULANATE POTASSIUM 875; 125 MG/1; MG/1
1 TABLET, FILM COATED ORAL EVERY 12 HOURS
Qty: 20 TABLET | Refills: 0 | Status: SHIPPED | OUTPATIENT
Start: 2023-05-21 | End: 2023-05-31

## 2023-05-21 RX ORDER — CIPROFLOXACIN AND DEXAMETHASONE 3; 1 MG/ML; MG/ML
4 SUSPENSION/ DROPS AURICULAR (OTIC) 2 TIMES DAILY
Qty: 7.5 ML | Refills: 0 | Status: SHIPPED | OUTPATIENT
Start: 2023-05-21 | End: 2023-05-26

## 2023-05-21 NOTE — PROGRESS NOTES
"Subjective:      Patient ID: Danyelle Garcia is a 52 y.o. female.    Vitals:  height is 5' 3" (1.6 m) and weight is 83.9 kg (185 lb). Her tympanic temperature is 97.6 °F (36.4 °C). Her blood pressure is 122/80 and her pulse is 77. Her respiration is 19 and oxygen saturation is 97%.     Chief Complaint: Otalgia    Pt states that she is having left ear pain that started 3 days ago. Pain level is 7/8 . Pt states that she has a itching , sharp pains that come and go .  Provider note begins below:  Pt denies any pmh, she started with left ear pain 3 days ago. Describes as a throbbing and sharp pain. Denies similar pain in the past. She tried tylenol over the past 3 days, but woke up this morning and the pain was worse, denies any fever, chills or rashes. Denies recent URI symptoms or hearing changes. Says it is either itching, throbbing, "feels like something going in there." Denies any recent swimming. Referred to est with pcp in march, she has not done this yet.     Otalgia   There is pain in the left ear. This is a new problem. The current episode started in the past 7 days. The problem occurs constantly. The problem has been unchanged. There has been no fever. The pain is severe. Pertinent negatives include no abdominal pain, coughing, diarrhea, ear discharge, headaches, hearing loss, neck pain, rash, rhinorrhea, sore throat or vomiting. She has tried nothing for the symptoms. There is no history of a chronic ear infection, hearing loss or a tympanostomy tube.     Constitution: Negative for activity change, appetite change, chills, sweating, fatigue, fever, unexpected weight change and generalized weakness.   HENT:  Positive for ear pain. Negative for ear discharge, foreign body in ear, tinnitus, hearing loss and sore throat.    Neck: Negative for neck pain.   Respiratory:  Negative for cough.    Gastrointestinal:  Negative for abdominal pain, vomiting and diarrhea.   Skin:  Negative for rash.   Neurological:  Negative " for headaches.    Objective:     Physical Exam   Constitutional: She is oriented to person, place, and time. She appears well-developed.  Non-toxic appearance. She does not appear ill. No distress.   HENT:   Head: Normocephalic and atraumatic.   Ears:   Right Ear: Hearing, tympanic membrane, external ear and ear canal normal. No mastoid tenderness. Tympanic membrane is not perforated, not erythematous, not retracted and not bulging.   Left Ear: External ear normal. There is tenderness (eac erythematous and ttp, no swelling). No no drainage or swelling. No mastoid tenderness. Tympanic membrane is erythematous. Tympanic membrane is not perforated, not retracted and not bulging.  No middle ear effusion.   Nose: Nose normal.   Mouth/Throat: Oropharynx is clear and moist.   Eyes: Conjunctivae, EOM and lids are normal. Pupils are equal, round, and reactive to light.   Neck: Trachea normal and phonation normal. Neck supple.   Musculoskeletal: Normal range of motion.         General: Normal range of motion.   Neurological: She is alert and oriented to person, place, and time.   Skin: Skin is warm, dry, intact and not diaphoretic.   Psychiatric: Her speech is normal and behavior is normal. Judgment and thought content normal.   Nursing note and vitals reviewed.    Assessment:     1. Acute diffuse otitis externa of left ear    2. Left ear pain        Plan:        The patient was advised that NSAID-type medications have two very important potential side effects: gastrointestinal irritation including hemorrhage and renal injuries. Pt was asked to take the medication with food and to stop if he/she experiences any GI upset. I asked pt to call for vomiting, abdominal pain or black/bloody stools. The patient expresses understanding of these issues and questions were answered.  Pt educated on common NSAIDS, and instructed not to mix them, and to follow instructions on the label for the formulations available over the counter. Pt  informed that common NSAIDs include: regular aspirin, Aleve, Advil, Excedrin, ibuprofen, Motrin, Goody's or, BC powders, meloxicam, naproxen, diclofenac, Celebrex. Patient was counseled that the chronic use of anti-inflammatory medication can increase the risk of GI bleed and cardiovascular events as well as cause kidney damage and increase blood pressure.    Encouraged her to establish with PCP, encouraged her to get the portal to make appointment, can she can follow-up with ENT if no improvement in symptoms.    Discussed results/diagnosis/plan with patient in clinic. Strict precautions given to patient to monitor for worsening signs and symptoms. Advised to follow up with PCP or specialist.    Explained side effects of medications prescribed with patient and informed him/her to discontinue use if he/she has any side effects and to inform UC or PCP if this occurs. All questions answered. Strict ED verses clinic return precautions stressed and given in depth. Advised if symptoms worsens of fail to improve he/she should go to the Emergency Room. Discharge and follow-up instructions given verbally/printed with the patient who expressed understanding and willingness to comply with my recommendations. Patient voiced understanding and in agreement with current treatment plan. Patient exits the exam room in no acute distress. Conversant and engaged during discharge discussion, verbalized understanding.      Acute diffuse otitis externa of left ear  -     ciprofloxacin-dexAMETHasone 0.3-0.1% (CIPRODEX) 0.3-0.1 % DrpS; Place 4 drops into the left ear 2 (two) times daily. for 5 days  Dispense: 7.5 mL; Refill: 0  -     amoxicillin-clavulanate 875-125mg (AUGMENTIN) 875-125 mg per tablet; Take 1 tablet by mouth every 12 (twelve) hours. for 10 days  Dispense: 20 tablet; Refill: 0  -     ibuprofen (ADVIL,MOTRIN) 600 MG tablet; Take 1 tablet (600 mg total) by mouth every 8 (eight) hours as needed for Pain (as needed for pain,  please take with food).  Dispense: 30 tablet; Refill: 0  -     Ambulatory referral/consult to ENT    Left ear pain  -     ciprofloxacin-dexAMETHasone 0.3-0.1% (CIPRODEX) 0.3-0.1 % DrpS; Place 4 drops into the left ear 2 (two) times daily. for 5 days  Dispense: 7.5 mL; Refill: 0  -     amoxicillin-clavulanate 875-125mg (AUGMENTIN) 875-125 mg per tablet; Take 1 tablet by mouth every 12 (twelve) hours. for 10 days  Dispense: 20 tablet; Refill: 0  -     ibuprofen (ADVIL,MOTRIN) 600 MG tablet; Take 1 tablet (600 mg total) by mouth every 8 (eight) hours as needed for Pain (as needed for pain, please take with food).  Dispense: 30 tablet; Refill: 0  -     Ambulatory referral/consult to ENT                Patient Instructions   General Discharge Instructions   PLEASE READ YOUR DISCHARGE INSTRUCTIONS ENTIRELY AS IT CONTAINS IMPORTANT INFORMATION.  If you were prescribed a narcotic or controlled medication, do not drive or operate heavy equipment or machinery while taking these medications.  If you were prescribed antibiotics, please take them to completion.  You must understand that you've received an Urgent Care treatment only and that you may be released before all your medical problems are known or treated. You, the patient, will arrange for follow up care as instructed.    OVER THE COUNTER RECOMMENDATIONS/SUGGESTIONS.    Make sure to stay well hydrated.    Use Nasal Saline to mechanically move any post nasal drip from your eustachian tube or from the back of your throat.    Use warm salt water gargles to ease your throat pain. Warm salt water gargles as needed for sore throat- 1/2 tsp salt to 1 cup warm water, gargle as desired.    Use an antihistamine such as Claritin, Zyrtec or Allegra to dry you out.    Use pseudoephedrine (behind the counter) to decongest. Pseudoephedrine 30 mg up to 240 mg /day. It can raise your blood pressure and give you palpitations.    Use mucinex (guaifenesin) to break up mucous up to  2400mg/day to loosen any mucous.    The mucinex DM pill has a cough suppressant that can be sedating. It can be used at night to stop the tickle at the back of your throat.    You can use Mucinex D (it has guaifenesin and a high dose of pseudoephedrine) in the mornings to help decongest.    Use Afrin in each nare for no longer than 3 days, as it is addictive. It can also dry out your mucous membranes and cause elevated blood pressure. This is especially useful if you are flying.    Use Flonase 1-2 sprays/nostril per day. It is a local acting steroid nasal spray, if you develop a bloody nose, stop using the medication immediately.    Sometimes Nyquil at night is beneficial to help you get some rest, however it is sedating and it does have an antihistamine, and tylenol.    Honey is a natural cough suppressant that can be used.    Tylenol up to 4,000 mg a day is safe for short periods and can be used for body aches, pain, and fever. However in high doses and prolonged use it can cause liver irritation.    Ibuprofen is a non-steroidal anti-inflammatory that can be used for body aches, pain, and fever.However it can also cause stomach irritation if over used.     Follow up with your PCP or specialty clinic as instructed in the next 2-3 days if not improved or as needed. You can call [phone] to schedule an appointment with appropriate provider.      If you condition worsens, we recommend that you receive another evaluation at the emergency room immediately or contact your primary medical clinic's after hours call service to discuss your concerns.      Please return here or go to the Emergency Department for any concerns or worsening condition.   You can also call [phone] to schedule an appointment with the appropriate provider.    Please return here or go to the Emergency Department for any concerns or worsening of condition.    Thank you for choosing [facility]!    Our goal in the Urgent Care is to  always provide outstanding medical care. You may receive a survey by mail or e-mail in the next week regarding your experience today. We would greatly appreciate you completing and returning the survey. Your feedback provides us with a way to recognize our staff who provide very good care, and it helps us learn how to improve when your experience was below our aspiration of excellence.      We appreciate you trusting us with your medical care. We hope you feel better soon. We will be happy to take care of you for all of your future medical needs.    Sincerely,    AUGUST Summers    General Referral to [facility]   You were referred to [facility] and pcp for follow-up care on your condition.    Please call [phone] to schedule your appointment.    Please go to the Emergency Department for any concerns or worsening of condition.  Please follow up with your primary care doctor or specialist in the next 48-72hrs as needed.    If you  smoke, please stop smoking.  You have been given an [facility] doctor referral. If you don't hear from them in a few days call [phone]  EAR INFECTION  Take full course of antibiotics as prescribed.  Warm compresses to affected ear  Elevate head on a pillow at night   Use nasal spray directed for nasal congestion  Tylenol or Motrin every 4 - 6 hours as needed for fever or ear pain.  Follow up with your PCP in 1 week of initiating antibiotics or sooner for no improvement in symptoms  Follow up in the ER for any worsening of symptoms such as new fever, increasing ear pain, neck stiffness, shortness of breath, etc.  If you smoke, stop smoking.

## 2023-07-09 ENCOUNTER — OFFICE VISIT (OUTPATIENT)
Dept: URGENT CARE | Facility: CLINIC | Age: 53
End: 2023-07-09
Payer: MEDICAID

## 2023-07-09 VITALS
TEMPERATURE: 98 F | DIASTOLIC BLOOD PRESSURE: 78 MMHG | RESPIRATION RATE: 17 BRPM | HEIGHT: 63 IN | HEART RATE: 87 BPM | WEIGHT: 185 LBS | SYSTOLIC BLOOD PRESSURE: 119 MMHG | BODY MASS INDEX: 32.78 KG/M2 | OXYGEN SATURATION: 97 %

## 2023-07-09 DIAGNOSIS — R07.9 CHEST PAIN, UNSPECIFIED TYPE: ICD-10-CM

## 2023-07-09 DIAGNOSIS — S29.011A CHEST WALL MUSCLE STRAIN, INITIAL ENCOUNTER: Primary | ICD-10-CM

## 2023-07-09 PROCEDURE — 71046 XR CHEST PA AND LATERAL: ICD-10-PCS | Mod: S$GLB,,, | Performed by: RADIOLOGY

## 2023-07-09 PROCEDURE — 71046 X-RAY EXAM CHEST 2 VIEWS: CPT | Mod: S$GLB,,, | Performed by: RADIOLOGY

## 2023-07-09 PROCEDURE — 99213 OFFICE O/P EST LOW 20 MIN: CPT | Mod: S$GLB,,,

## 2023-07-09 PROCEDURE — 99213 PR OFFICE/OUTPT VISIT, EST, LEVL III, 20-29 MIN: ICD-10-PCS | Mod: S$GLB,,,

## 2023-07-09 PROCEDURE — 93005 ELECTROCARDIOGRAM TRACING: CPT | Mod: S$GLB,,,

## 2023-07-09 PROCEDURE — 93005 EKG 12-LEAD: ICD-10-PCS | Mod: S$GLB,,,

## 2023-07-09 PROCEDURE — 93010 ELECTROCARDIOGRAM REPORT: CPT | Mod: S$PBB,,, | Performed by: INTERNAL MEDICINE

## 2023-07-09 PROCEDURE — 93010 EKG 12-LEAD: ICD-10-PCS | Mod: S$PBB,,, | Performed by: INTERNAL MEDICINE

## 2023-07-09 RX ORDER — IBUPROFEN 800 MG/1
800 TABLET ORAL EVERY 8 HOURS PRN
Qty: 21 TABLET | Refills: 0 | Status: SHIPPED | OUTPATIENT
Start: 2023-07-09

## 2023-07-09 NOTE — PATIENT INSTRUCTIONS
Please return here or go to the Emergency Department for any concerns or worsening of condition.    Please take IBUPROFEN every 8 hours as needed for pain/inflammation, you may decrease to every 12 hour, or once daily, or discontinue as your symptoms improve.     Please be advised that you have received urgent care treatment and although cardiac/pulmonary causes seems less likely, it can not be ruled out completely in this setting. If you start to experience the warning signs that we discussed today, please go to the ER to have further imaging and additional lab work done.    You were given a referral to cardiology, please call [phone] for scheduling and appointments.     Please follow up with your primary care doctor or specialist as needed.    If you  smoke, please stop smoking.

## 2023-07-09 NOTE — PROGRESS NOTES
"Subjective:      Patient ID: Danyelle Garcia is a 52 y.o. female.    Vitals:  height is 5' 3" (1.6 m) and weight is 83.9 kg (185 lb). Her tympanic temperature is 97.7 °F (36.5 °C). Her blood pressure is 119/78 and her pulse is 87. Her respiration is 17 and oxygen saturation is 97%.     Chief Complaint: Chest Pain    Patient states that for the past 5 days she has been having some chest pain. She states it as a dull pain. She states that she looked online and thinks she is having a heart attack. Patient states that she is under a lot of stress (upcoming trip and son going to college) and also going through menopause. Associated symptoms include headaches. Patient states that nothing makes her pain worse. Pt states of no irregular heartbeat, no palpations, and no lightheadedness/dizziness. She denies fever, chills, SOB, leg swelling, nausea, vomiting, abdominal pain, heartburn. Patient denies history of HTN, DM, CAD, HLD, CA. Patient states that she took tylenol and that the pain goes away but it returns.    Chest Pain   This is a new problem. The current episode started in the past 7 days (5 days ago). The onset quality is sudden. The problem occurs intermittently. The problem has been unchanged. The pain is at a severity of 4/10. The pain is mild. The quality of the pain is described as dull. The pain does not radiate. Associated symptoms include headaches and malaise/fatigue. Pertinent negatives include no abdominal pain, back pain, claudication, cough, diaphoresis, dizziness, exertional chest pressure, fever, hemoptysis, irregular heartbeat, leg pain, lower extremity edema, nausea, near-syncope, numbness, orthopnea, palpitations, PND, shortness of breath, sputum production, syncope, vomiting or weakness. The pain is aggravated by nothing. She has tried acetaminophen for the symptoms. The treatment provided significant (but then it returns) relief. Risk factors include stress.   Pertinent negatives for past medical " history include no aneurysm, no anxiety/panic attacks, no aortic aneurysm, no aortic dissection, no COPD, no CHF, no diabetes, no hyperlipidemia, no hypertension, no MI, no PE, no seizures, no sickle cell disease, no spontaneous pneumothorax, no strokes and no TIA.   Her family medical history is significant for CAD, diabetes, heart disease and hypertension.   Pertinent negatives for family medical history include: no hyperlipidemia, no sickle cell disease and no sudden death.     Constitution: Negative for sweating and fever.   Cardiovascular:  Positive for chest pain. Negative for palpitations and passing out.   Respiratory:  Negative for cough, sputum production, bloody sputum, COPD and shortness of breath.    Gastrointestinal:  Negative for abdominal pain, nausea and vomiting.   Musculoskeletal:  Negative for back pain.   Neurological:  Positive for headaches. Negative for dizziness, numbness and seizures.      Objective:     Physical Exam   Constitutional:  Non-toxic appearance. She does not appear ill. No distress. normal  HENT:   Head: Normocephalic.   Cardiovascular: Normal rate, regular rhythm and normal heart sounds.   No murmur heard.Exam reveals no gallop and no friction rub.      Comments: No calf tenderness, no calf swelling, no calf erythema appreciated during exam.   Pulmonary/Chest: Effort normal and breath sounds normal. No stridor. No respiratory distress. She has no wheezes. She has no rhonchi. She has no rales. She exhibits tenderness.   Red represents area of reproducible chest wall tenderness             Comments: Patient is in no respiratory distress. Breath sounds even, unlabored, and clear to auscultation bilaterally. No accessory muscle usage. Patient able to speak in complete sentences with ease. and Red represents area of reproducible chest wall tenderness    Abdominal: Normal appearance.   Neurological: She is alert.   Skin: Skin is not diaphoretic.   Nursing note and vitals  "reviewed.    XR CHEST PA AND LATERAL    Result Date: 7/9/2023  EXAMINATION: XR CHEST PA AND LATERAL CLINICAL HISTORY: Provided history is "  Chest pain, unspecified". TECHNIQUE: Frontal and lateral views of the chest were performed. COMPARISON: 12/12/2022. FINDINGS: Cardiac silhouette is not enlarged. No focal consolidation.  No sizable pleural effusion.  No pneumothorax.  Surgical clips overlie the upper abdomen suggestive of prior cholecystectomy.     No acute cardiopulmonary finding. Electronically signed by: Richard Horan MD Date:    07/09/2023 Time:    14:42     The EKG shows a normal, regular Sinus Rhythm, at a rate of 69, there are not ST Changes. There is not a previous EKG for comparison. This EKG was interpreted by me (and discussed with Dr. Douglass).    Assessment:     1. Chest wall muscle strain, initial encounter    2. Chest pain, unspecified type      Plan:   Previous notes reviewed.  Vital signs reviewed.  Labs ordered. Labs reviewed.  Discussed chest wall strain, home care, tx options, and given follow up precautions.  Patient was briefed on my thought process and diagnosis.   Patient involved with the treatment plan and agreed to the plan. Pain is likely due to chest wall strain, stable vitals, low RF, non-toxic appearing, reproducible pain, normal EKG/CXR, cardiac/pulmonary etiology appear to be less likely at this time, will give patient anti-inflammatory medications to help with inflammation, cardiology referral given for further workup, ER precautions given.  Patient informed on warning signs, patient understood warning signs and to go to urgent care or ER if warning signs appear.  Patient discussed with Dr. Trish Douglass via secure chat, Dr. Douglass agrees to the plan.     Patient Instructions   Please return here or go to the Emergency Department for any concerns or worsening of condition.    Please take IBUPROFEN every 8 hours as needed for pain/inflammation, you may decrease to every 12 " hour, or once daily, or discontinue as your symptoms improve.     Please be advised that you have received urgent care treatment and although cardiac/pulmonary causes seems less likely, it can not be ruled out completely in this setting. If you start to experience the warning signs that we discussed today, please go to the ER to have further imaging and additional lab work done.    You were given a referral to cardiology, please call [phone] for scheduling and appointments.     Please follow up with your primary care doctor or specialist as needed.    If you  smoke, please stop smoking.    Chest wall muscle strain, initial encounter  -     ibuprofen (ADVIL,MOTRIN) 800 MG tablet; Take 1 tablet (800 mg total) by mouth every 8 (eight) hours as needed for Pain.  Dispense: 21 tablet; Refill: 0    Chest pain, unspecified type  -     XR CHEST PA AND LATERAL; Future; Expected date: 07/09/2023  -     IN OFFICE EKG 12-LEAD (to [place])  -     Ambulatory referral/consult to Cardiology      Medical Decision Making:   Differential Diagnosis:   NSTEMI/STEMI, AAA, aortic dissection, GERD, PNA, PTX, PE, chest wall strain, anxiety/stress  Additional MDM:     Heart Failure Score:   COPD = No    Irma'Jay Murguia PA-C

## 2023-08-21 PROBLEM — J01.90 ACUTE SINUSITIS: Status: RESOLVED | Noted: 2020-02-21 | Resolved: 2023-08-21

## 2023-09-02 ENCOUNTER — OFFICE VISIT (OUTPATIENT)
Dept: URGENT CARE | Facility: CLINIC | Age: 53
End: 2023-09-02
Payer: MEDICAID

## 2023-09-02 VITALS
BODY MASS INDEX: 32.78 KG/M2 | HEIGHT: 63 IN | DIASTOLIC BLOOD PRESSURE: 85 MMHG | RESPIRATION RATE: 16 BRPM | SYSTOLIC BLOOD PRESSURE: 127 MMHG | TEMPERATURE: 99 F | HEART RATE: 109 BPM | WEIGHT: 185 LBS | OXYGEN SATURATION: 95 %

## 2023-09-02 DIAGNOSIS — A05.9 FOOD POISONING: Primary | ICD-10-CM

## 2023-09-02 PROCEDURE — 99213 PR OFFICE/OUTPT VISIT, EST, LEVL III, 20-29 MIN: ICD-10-PCS | Mod: S$GLB,,, | Performed by: FAMILY MEDICINE

## 2023-09-02 PROCEDURE — 99213 OFFICE O/P EST LOW 20 MIN: CPT | Mod: S$GLB,,, | Performed by: FAMILY MEDICINE

## 2023-09-02 RX ORDER — ONDANSETRON 8 MG/1
8 TABLET, ORALLY DISINTEGRATING ORAL
Status: COMPLETED | OUTPATIENT
Start: 2023-09-02 | End: 2023-09-02

## 2023-09-02 RX ORDER — ONDANSETRON HYDROCHLORIDE 8 MG/1
8 TABLET, FILM COATED ORAL 2 TIMES DAILY
Qty: 12 TABLET | Refills: 0 | Status: SHIPPED | OUTPATIENT
Start: 2023-09-02

## 2023-09-02 RX ORDER — DICYCLOMINE HYDROCHLORIDE 20 MG/1
20 TABLET ORAL 3 TIMES DAILY PRN
Qty: 15 TABLET | Refills: 0 | Status: SHIPPED | OUTPATIENT
Start: 2023-09-02 | End: 2023-09-07

## 2023-09-02 RX ADMIN — ONDANSETRON 8 MG: 8 TABLET, ORALLY DISINTEGRATING ORAL at 10:09

## 2023-09-02 NOTE — PROGRESS NOTES
"Subjective:      Patient ID: Danyelle Garcia is a 53 y.o. female.    Vitals:  height is 5' 3" (1.6 m) and weight is 83.9 kg (185 lb). Her oral temperature is 98.7 °F (37.1 °C). Her blood pressure is 127/85 and her pulse is 109. Her respiration is 16 and oxygen saturation is 95%.     Chief Complaint: Emesis    Patient is here NVD, abdominal pain, headache that started last night. Patient suspect food poisoning from eating cracklin.    Emesis   This is a new problem. The current episode started yesterday. The problem has been gradually worsening. The emesis has an appearance of bile. There has been no fever. Associated symptoms include abdominal pain, diarrhea and headaches. Associated symptoms comments: nausea. Risk factors include suspect food intake. Treatments tried: pepto bismo. The treatment provided no relief.       Gastrointestinal:  Positive for abdominal pain, vomiting and diarrhea.   Neurological:  Positive for headaches.      Objective:     Physical Exam   Constitutional: She is oriented to person, place, and time.  Non-toxic appearance. She appears ill.   HENT:   Head: Normocephalic.   Ears:   Right Ear: External ear normal.   Left Ear: External ear normal.   Nose: Nose normal.   Mouth/Throat: Mucous membranes are moist.   Eyes: Conjunctivae are normal.   Cardiovascular: Normal rate.   Pulmonary/Chest: Effort normal.   Abdominal: Soft.      Comments: Hyperactive BS in all quadrants.    Musculoskeletal: Normal range of motion.         General: Normal range of motion.   Neurological: She is alert and oriented to person, place, and time.   Skin: Skin is diaphoretic.   Psychiatric: Her behavior is normal.       Assessment:     1. Food poisoning        Plan:       Food poisoning  -     ondansetron disintegrating tablet 8 mg  -     ondansetron (ZOFRAN) 8 MG tablet; Take 1 tablet (8 mg total) by mouth 2 (two) times daily.  Dispense: 12 tablet; Refill: 0  -     dicyclomine (BENTYL) 20 mg tablet; Take 1 tablet (20 " mg total) by mouth 3 (three) times daily as needed (abdominal pain).  Dispense: 15 tablet; Refill: 0      Tachycardic, otherwise HDS   Able to tolerate sips of water after zofran  RTC PRN

## 2023-09-15 ENCOUNTER — OFFICE VISIT (OUTPATIENT)
Dept: URGENT CARE | Facility: CLINIC | Age: 53
End: 2023-09-15
Payer: MEDICAID

## 2023-09-15 VITALS
HEART RATE: 96 BPM | SYSTOLIC BLOOD PRESSURE: 134 MMHG | BODY MASS INDEX: 32.78 KG/M2 | OXYGEN SATURATION: 98 % | WEIGHT: 185 LBS | DIASTOLIC BLOOD PRESSURE: 77 MMHG | RESPIRATION RATE: 17 BRPM | HEIGHT: 63 IN | TEMPERATURE: 98 F

## 2023-09-15 DIAGNOSIS — Z76.89 ENCOUNTER TO ESTABLISH CARE: ICD-10-CM

## 2023-09-15 DIAGNOSIS — R07.89 MID STERNAL CHEST PAIN: ICD-10-CM

## 2023-09-15 DIAGNOSIS — R07.89 CHEST PRESSURE: Primary | ICD-10-CM

## 2023-09-15 PROCEDURE — 93010 EKG 12-LEAD: ICD-10-PCS | Mod: S$PBB,,, | Performed by: INTERNAL MEDICINE

## 2023-09-15 PROCEDURE — 93005 EKG 12-LEAD: ICD-10-PCS | Mod: S$GLB,,, | Performed by: NURSE PRACTITIONER

## 2023-09-15 PROCEDURE — 93010 ELECTROCARDIOGRAM REPORT: CPT | Mod: S$PBB,,, | Performed by: INTERNAL MEDICINE

## 2023-09-15 PROCEDURE — 71046 X-RAY EXAM CHEST 2 VIEWS: CPT | Mod: S$GLB,,, | Performed by: RADIOLOGY

## 2023-09-15 PROCEDURE — 93005 ELECTROCARDIOGRAM TRACING: CPT | Mod: S$GLB,,, | Performed by: NURSE PRACTITIONER

## 2023-09-15 PROCEDURE — 99214 PR OFFICE/OUTPT VISIT, EST, LEVL IV, 30-39 MIN: ICD-10-PCS | Mod: S$GLB,,, | Performed by: NURSE PRACTITIONER

## 2023-09-15 PROCEDURE — 71046 XR CHEST PA AND LATERAL: ICD-10-PCS | Mod: S$GLB,,, | Performed by: RADIOLOGY

## 2023-09-15 PROCEDURE — 99214 OFFICE O/P EST MOD 30 MIN: CPT | Mod: S$GLB,,, | Performed by: NURSE PRACTITIONER

## 2023-09-15 RX ORDER — LIDOCAINE HYDROCHLORIDE 20 MG/ML
10 SOLUTION OROPHARYNGEAL
Status: COMPLETED | OUTPATIENT
Start: 2023-09-15 | End: 2023-09-15

## 2023-09-15 RX ORDER — MAG HYDROX/ALUMINUM HYD/SIMETH 200-200-20
30 SUSPENSION, ORAL (FINAL DOSE FORM) ORAL
Status: COMPLETED | OUTPATIENT
Start: 2023-09-15 | End: 2023-09-15

## 2023-09-15 RX ORDER — KETOROLAC TROMETHAMINE 30 MG/ML
30 INJECTION, SOLUTION INTRAMUSCULAR; INTRAVENOUS
Status: COMPLETED | OUTPATIENT
Start: 2023-09-15 | End: 2023-09-15

## 2023-09-15 RX ADMIN — KETOROLAC TROMETHAMINE 30 MG: 30 INJECTION, SOLUTION INTRAMUSCULAR; INTRAVENOUS at 12:09

## 2023-09-15 RX ADMIN — Medication 30 ML: at 01:09

## 2023-09-15 RX ADMIN — LIDOCAINE HYDROCHLORIDE 10 ML: 20 SOLUTION OROPHARYNGEAL at 12:09

## 2023-09-15 NOTE — PROGRESS NOTES
"Subjective:      Patient ID: Danyelle Garcia is a 53 y.o. female.    Vitals:  height is 5' 3" (1.6 m) and weight is 83.9 kg (185 lb). Her oral temperature is 98.1 °F (36.7 °C). Her blood pressure is 134/77 and her pulse is 96. Her respiration is 17 and oxygen saturation is 98%.     Chief Complaint: No chief complaint on file.    53-year-old female presents to clinic for evaluation of mid sternal chest pressure for the last 5 days.  She also reports associated headache, temporarily relieved with Tylenol.  She states that she has been under a lot of stress with her job.  She does report some reflux symptoms.  Denies any previous diagnoses of GERD.  States that she denies pain, associates more with a pressure.  Denies shortness of breath.  She denies cough or congestion.  She is awake and alert, answers questions appropriately, no acute distress noted on today's visit.    Of note, patient had similar urgent care visit in July of 2023 with similar HPI.  A referral was placed to Cardiology, of which patient did not follow.       Constitution: Negative for activity change, appetite change, chills, sweating, fatigue and fever.   HENT:  Negative for congestion.    Cardiovascular:  Negative for palpitations and sob on exertion. Chest pain: mid-sternal chest pressure.  Respiratory:  Negative for cough and shortness of breath.    Neurological:  Positive for headaches. Negative for dizziness, numbness and tingling.      Objective:     Physical Exam   Constitutional: She is oriented to person, place, and time. She does not appear ill. No distress.   HENT:   Head: Normocephalic and atraumatic.   Ears:   Right Ear: External ear normal.   Left Ear: External ear normal.   Nose: Nose normal.   Eyes: Conjunctivae are normal. Right eye exhibits no discharge. Left eye exhibits no discharge.   Cardiovascular: Normal rate, regular rhythm and normal pulses.   Pulmonary/Chest: Effort normal and breath sounds normal. No respiratory distress. " She has no decreased breath sounds. She has no wheezes. She exhibits no tenderness (no palpable tenderness).       Abdominal: Normal appearance.   Musculoskeletal:         General: No swelling or tenderness.   Neurological: She is alert and oriented to person, place, and time.   Skin: Skin is dry, not diaphoretic and not pale.   Psychiatric: Her behavior is normal. Mood, judgment and thought content normal.   Nursing note and vitals reviewed.    EKG: Sinus bradycardia at a rate of 59 beats per minute.  No ST elevation, no obvious change when compared to previous in system.    X-Ray Chest PA And Lateral    Result Date: 9/15/2023  EXAMINATION: XR CHEST PA AND LATERAL CLINICAL HISTORY: Other chest pain TECHNIQUE: PA and lateral views of the chest were performed. COMPARISON: N 07/09/2023 one FINDINGS: Heart size normal.  The lungs are clear.  No pleural effusion     See above Electronically signed by: Dank Antonio MD Date:    09/15/2023 Time:    12:50       Assessment:     1. Chest pressure    2. Mid sternal chest pain    3. Encounter to establish care        Plan:       Chest pressure  -     IN OFFICE EKG 12-LEAD (to [facility])  -     X-Ray Chest PA And Lateral; Future; Expected date: 09/15/2023  -     ketorolac injection 30 mg    Mid sternal chest pain  -     LIDOcaine HCl 2% oral solution 10 mL  -     aluminum-magnesium hydroxide-simethicone 200-200-20 mg/5 mL suspension 30 mL    Encounter to establish care  -     Ambulatory referral/consult to Internal Medicine      - EKG normal, chest x-ray normal.  Stressed the importance to establish care with primary care doctor.  ER precautions given for worsening symptoms.  Patient verbalized understanding and is in agreement with plan.    Patient Instructions   - Follow up with your PCP or specialty clinic as directed in the next 1-2 weeks if not improved or as needed.  You can call [phone] to schedule an appointment with the appropriate provider.    - Go to the ER or seek  medical attention immediately if you develop new or worsening symptoms.    - You must understand that you have received an Urgent Care treatment only and that you may be released before all of your medical problems are known or treated.   - You, the patient, will arrange for follow up care as instructed.   - If your condition worsens or fails to improve we recommend that you receive another evaluation at the ER immediately or contact your PCP to discuss your concerns or return here.

## 2023-09-15 NOTE — PATIENT INSTRUCTIONS
- Follow up with your PCP or specialty clinic as directed in the next 1-2 weeks if not improved or as needed.  You can call [phone] to schedule an appointment with the appropriate provider.    - Go to the ER or seek medical attention immediately if you develop new or worsening symptoms.    - You must understand that you have received an Urgent Care treatment only and that you may be released before all of your medical problems are known or treated.   - You, the patient, will arrange for follow up care as instructed.   - If your condition worsens or fails to improve we recommend that you receive another evaluation at the ER immediately or contact your PCP to discuss your concerns or return here.

## 2023-12-25 ENCOUNTER — OFFICE VISIT (OUTPATIENT)
Dept: URGENT CARE | Facility: CLINIC | Age: 53
End: 2023-12-25
Payer: MEDICAID

## 2023-12-25 VITALS
WEIGHT: 184.94 LBS | DIASTOLIC BLOOD PRESSURE: 85 MMHG | HEART RATE: 102 BPM | BODY MASS INDEX: 32.77 KG/M2 | SYSTOLIC BLOOD PRESSURE: 128 MMHG | OXYGEN SATURATION: 96 % | TEMPERATURE: 98 F | HEIGHT: 63 IN | RESPIRATION RATE: 16 BRPM

## 2023-12-25 DIAGNOSIS — Z76.89 ENCOUNTER TO ESTABLISH CARE: ICD-10-CM

## 2023-12-25 DIAGNOSIS — R09.82 POST-NASAL DRIP: ICD-10-CM

## 2023-12-25 DIAGNOSIS — R05.1 ACUTE COUGH: Primary | ICD-10-CM

## 2023-12-25 PROCEDURE — 99213 OFFICE O/P EST LOW 20 MIN: CPT | Mod: S$GLB,,, | Performed by: FAMILY MEDICINE

## 2023-12-25 PROCEDURE — 99213 PR OFFICE/OUTPT VISIT, EST, LEVL III, 20-29 MIN: ICD-10-PCS | Mod: S$GLB,,, | Performed by: FAMILY MEDICINE

## 2023-12-25 RX ORDER — GUAIFENESIN 600 MG/1
1200 TABLET, EXTENDED RELEASE ORAL 2 TIMES DAILY
Qty: 40 TABLET | Refills: 0 | Status: SHIPPED | OUTPATIENT
Start: 2023-12-25 | End: 2024-01-04

## 2023-12-25 RX ORDER — PROMETHAZINE HYDROCHLORIDE AND DEXTROMETHORPHAN HYDROBROMIDE 6.25; 15 MG/5ML; MG/5ML
5 SYRUP ORAL EVERY 6 HOURS PRN
Qty: 118 ML | Refills: 0 | Status: SHIPPED | OUTPATIENT
Start: 2023-12-25

## 2023-12-25 RX ORDER — CETIRIZINE HYDROCHLORIDE 10 MG/1
10 TABLET ORAL DAILY
Qty: 30 TABLET | Refills: 0 | Status: SHIPPED | OUTPATIENT
Start: 2023-12-25 | End: 2024-01-24

## 2023-12-25 RX ORDER — AZELASTINE 1 MG/ML
1 SPRAY, METERED NASAL 2 TIMES DAILY
Qty: 30 ML | Refills: 0 | Status: SHIPPED | OUTPATIENT
Start: 2023-12-25 | End: 2024-12-24

## 2023-12-25 RX ORDER — FLUTICASONE PROPIONATE 50 MCG
1 SPRAY, SUSPENSION (ML) NASAL DAILY
Qty: 16 G | Refills: 0 | Status: SHIPPED | OUTPATIENT
Start: 2023-12-25

## 2023-12-25 RX ORDER — BENZONATATE 200 MG/1
200 CAPSULE ORAL 3 TIMES DAILY PRN
Qty: 30 CAPSULE | Refills: 0 | Status: SHIPPED | OUTPATIENT
Start: 2023-12-25 | End: 2024-01-04

## 2023-12-25 NOTE — PROGRESS NOTES
"Subjective:      Patient ID: Danyelle Garcia is a 53 y.o. female.    Vitals:  height is 5' 3" (1.6 m) and weight is 83.9 kg (184 lb 15.5 oz). Her oral temperature is 98.4 °F (36.9 °C). Her blood pressure is 128/85 and her pulse is 102. Her respiration is 16 and oxygen saturation is 96%.     Chief Complaint: Cough    Pt presents today for a cough that started 10 days ago. Pt day & night quil and allergy relief.   Provider note begins below:  Pt denies any pmh c/o cough x 10 days she has been trying otc medications. No fever or chills. No cp or SOB. No GI related symptoms, including, N/v/D or constipation. No anosmia or ageusia. She says the cough is worse at night, and feels like it is in her throat. She says it is a dry cough. Denies any hx of pna. She reports she feels well otherwise.       Cough  This is a new problem. The current episode started in the past 7 days. The problem has been unchanged. The problem occurs constantly. The cough is Non-productive. Associated symptoms include postnasal drip. Pertinent negatives include no chest pain, chills, ear congestion, ear pain, fever, headaches, heartburn, hemoptysis, myalgias, nasal congestion, rash, rhinorrhea, sore throat, shortness of breath, sweats, weight loss or wheezing. Nothing aggravates the symptoms. She has tried OTC cough suppressant for the symptoms. The treatment provided significant relief. There is no history of asthma, bronchiectasis, bronchitis, COPD, emphysema, environmental allergies or pneumonia.       Constitution: Negative for chills and fever.   HENT:  Positive for postnasal drip. Negative for ear pain and sore throat.    Cardiovascular:  Negative for chest pain.   Respiratory:  Positive for cough. Negative for bloody sputum, shortness of breath and wheezing.    Gastrointestinal:  Negative for heartburn.   Musculoskeletal:  Negative for muscle ache.   Skin:  Negative for rash.   Allergic/Immunologic: Negative for environmental allergies. "   Neurological:  Negative for headaches.      Objective:     Physical Exam   Constitutional: She is oriented to person, place, and time. She appears well-developed.  Non-toxic appearance. She does not appear ill. No distress.   HENT:   Head: Normocephalic and atraumatic.   Ears:   Right Ear: External ear normal.   Left Ear: External ear normal.   Nose: Nose normal.   Mouth/Throat: Uvula is midline, oropharynx is clear and moist and mucous membranes are normal. No trismus in the jaw. No uvula swelling. No oropharyngeal exudate, posterior oropharyngeal edema, posterior oropharyngeal erythema, tonsillar abscesses or cobblestoning. No tonsillar exudate.   Eyes: Conjunctivae, EOM and lids are normal. Pupils are equal, round, and reactive to light.   Neck: Trachea normal and phonation normal. Neck supple.   Pulmonary/Chest: Effort normal and breath sounds normal.   Musculoskeletal: Normal range of motion.         General: Normal range of motion.   Neurological: She is alert and oriented to person, place, and time.   Skin: Skin is warm, dry, intact and not diaphoretic.   Psychiatric: Her speech is normal and behavior is normal. Judgment and thought content normal.   Nursing note and vitals reviewed.      Assessment:     1. Acute cough    2. Post-nasal drip    3. Encounter to establish care        Plan:     Lungs ctab, nad, vss, well appearing, otc meds reviewed, declined rtw note    Discussed results/diagnosis/plan with patient in clinic. Strict precautions given to patient to monitor for worsening signs and symptoms. Advised to follow up with PCP or specialist.    Explained side effects of medications prescribed with patient and informed him/her to discontinue use if he/she has any side effects and to inform UC or PCP if this occurs. All questions answered. Strict ED verses clinic return precautions stressed and given in depth. Advised if symptoms worsens of fail to improve he/she should go to the Emergency Room.  Discharge and follow-up instructions given verbally/printed with the patient who expressed understanding and willingness to comply with my recommendations. Patient voiced understanding and in agreement with current treatment plan. Patient exits the exam room in no acute distress. Conversant and engaged during discharge discussion, verbalized understanding.      Acute cough  -     guaiFENesin (MUCINEX) 600 mg 12 hr tablet; Take 2 tablets (1,200 mg total) by mouth 2 (two) times daily. for 10 days  Dispense: 40 tablet; Refill: 0  -     benzonatate (TESSALON) 200 MG capsule; Take 1 capsule (200 mg total) by mouth 3 (three) times daily as needed.  Dispense: 30 capsule; Refill: 0  -     fluticasone propionate (FLONASE) 50 mcg/actuation nasal spray; 1 spray (50 mcg total) by Each Nostril route once daily.  Dispense: 16 g; Refill: 0  -     azelastine (ASTELIN) 137 mcg (0.1 %) nasal spray; 1 spray (137 mcg total) by Nasal route 2 (two) times daily.  Dispense: 30 mL; Refill: 0  -     cetirizine (ZYRTEC) 10 MG tablet; Take 1 tablet (10 mg total) by mouth once daily.  Dispense: 30 tablet; Refill: 0  -     promethazine-dextromethorphan (PROMETHAZINE-DM) 6.25-15 mg/5 mL Syrp; Take 5 mLs by mouth every 6 (six) hours as needed (cough).  Dispense: 118 mL; Refill: 0    Post-nasal drip  -     fluticasone propionate (FLONASE) 50 mcg/actuation nasal spray; 1 spray (50 mcg total) by Each Nostril route once daily.  Dispense: 16 g; Refill: 0  -     azelastine (ASTELIN) 137 mcg (0.1 %) nasal spray; 1 spray (137 mcg total) by Nasal route 2 (two) times daily.  Dispense: 30 mL; Refill: 0  -     cetirizine (ZYRTEC) 10 MG tablet; Take 1 tablet (10 mg total) by mouth once daily.  Dispense: 30 tablet; Refill: 0    Encounter to establish care  -     Ambulatory referral/consult to \[facility]\"" [facility]                Patient Instructions   General Discharge Instructions   PLEASE READ YOUR DISCHARGE INSTRUCTIONS ENTIRELY AS IT CONTAINS IMPORTANT  INFORMATION.  If you were prescribed a narcotic or controlled medication, do not drive or operate heavy equipment or machinery while taking these medications.  If you were prescribed antibiotics, please take them to completion.  You must understand that you've received an Urgent Care treatment only and that you may be released before all your medical problems are known or treated. You, the patient, will arrange for follow up care as instructed.    OVER THE COUNTER RECOMMENDATIONS/SUGGESTIONS.    Make sure to stay well hydrated.    Use Nasal Saline to mechanically move any post nasal drip from your eustachian tube or from the back of your throat.    Use warm salt water gargles to ease your throat pain. Warm salt water gargles as needed for sore throat- 1/2 tsp salt to 1 cup warm water, gargle as desired.    Use an antihistamine such as Claritin, Zyrtec or Allegra to dry you out.    Use pseudoephedrine (behind the counter) to decongest. Pseudoephedrine 30 mg up to 240 mg /day. It can raise your blood pressure and give you palpitations.    Use mucinex (guaifenesin) to break up mucous up to 2400mg/day to loosen any mucous.    The mucinex DM pill has a cough suppressant that can be sedating. It can be used at night to stop the tickle at the back of your throat.    You can use Mucinex D (it has guaifenesin and a high dose of pseudoephedrine) in the mornings to help decongest.    Use Afrin in each nare for no longer than 3 days, as it is addictive. It can also dry out your mucous membranes and cause elevated blood pressure. This is especially useful if you are flying.    Use Flonase 1-2 sprays/nostril per day. It is a local acting steroid nasal spray, if you develop a bloody nose, stop using the medication immediately.    Sometimes Nyquil at night is beneficial to help you get some rest, however it is sedating and it does have an antihistamine, and tylenol.    Honey is a natural cough suppressant that can be  used.    Tylenol up to 4,000 mg a day is safe for short periods and can be used for body aches, pain, and fever. However in high doses and prolonged use it can cause liver irritation.    Ibuprofen is a non-steroidal anti-inflammatory that can be used for body aches, pain, and fever.However it can also cause stomach irritation if over used.     Follow up with your PCP or specialty clinic as instructed in the next 2-3 days if not improved or as needed. You can call [phone] to schedule an appointment with appropriate provider.      If you condition worsens, we recommend that you receive another evaluation at the emergency room immediately or contact your primary medical clinic's after hours call service to discuss your concerns.      Please return here or go to the Emergency Department for any concerns or worsening condition.   You can also call [phone] to schedule an appointment with the appropriate provider.    Please return here or go to the Emergency Department for any concerns or worsening of condition.    Thank you for choosing [facility]!    Our goal in the Urgent Care is to always provide outstanding medical care. You may receive a survey by mail or e-mail in the next week regarding your experience today. We would greatly appreciate you completing and returning the survey. Your feedback provides us with a way to recognize our staff who provide very good care, and it helps us learn how to improve when your experience was below our aspiration of excellence.      We appreciate you trusting us with your medical care. We hope you feel better soon. We will be happy to take care of you for all of your future medical needs.    Sincerely,    AUGUST Summers  Cough   If your condition worsens or fails to improve we recommend that you receive another evaluation at the ER immediately or contact your PCP to discuss your concerns or return here. You must understand that you've received an urgent  "care treatment only and that you may be released before all your medical problems are known or treated. You the patient will arrange for follouwp care as instructed. .  Rest and fluids are important  Can use honey with ant to soothe your throat  Take prescription cough meds (pills) as prescribed; take prescription cough syrup at night as needed for cough.  Do not take both the prescribed cough pills and syrup at the same time or within 6 hours of each other.  Do not take the cough syrup with any other sedative medication as it can can cause drowsiness. Do not operate any heavy machinery, drink or drive while taking the cough syrup.   -  Flonase (fluticasone) is a nasal spray which is available over the counter and may help with your symptoms.   -  If you have hypertension avoid using the "D" which is the decongestant.  Instead you can use Coricidin HBP for cold and cough symptoms.    -  If you just have drainage you can take plain Zyrtec, Claritin or Allegra   -  Tylenol or ibuprofen can also be used as directed for pain unless you have an allergy to them or medical condition such as stomach ulcers, kidney or liver disease or blood thinners etc for which you should not be taking these type of medications.   Please follow up with your primary care doctor or specialist in the next 48-72hrs as needed and if no improvement  If you  smoke, please stop smoking.        "

## 2023-12-25 NOTE — PATIENT INSTRUCTIONS
General Discharge Instructions   PLEASE READ YOUR DISCHARGE INSTRUCTIONS ENTIRELY AS IT CONTAINS IMPORTANT INFORMATION.  If you were prescribed a narcotic or controlled medication, do not drive or operate heavy equipment or machinery while taking these medications.  If you were prescribed antibiotics, please take them to completion.  You must understand that you've received an Urgent Care treatment only and that you may be released before all your medical problems are known or treated. You, the patient, will arrange for follow up care as instructed.    OVER THE COUNTER RECOMMENDATIONS/SUGGESTIONS.    Make sure to stay well hydrated.    Use Nasal Saline to mechanically move any post nasal drip from your eustachian tube or from the back of your throat.    Use warm salt water gargles to ease your throat pain. Warm salt water gargles as needed for sore throat- 1/2 tsp salt to 1 cup warm water, gargle as desired.    Use an antihistamine such as Claritin, Zyrtec or Allegra to dry you out.    Use pseudoephedrine (behind the counter) to decongest. Pseudoephedrine 30 mg up to 240 mg /day. It can raise your blood pressure and give you palpitations.    Use mucinex (guaifenesin) to break up mucous up to 2400mg/day to loosen any mucous.    The mucinex DM pill has a cough suppressant that can be sedating. It can be used at night to stop the tickle at the back of your throat.    You can use Mucinex D (it has guaifenesin and a high dose of pseudoephedrine) in the mornings to help decongest.    Use Afrin in each nare for no longer than 3 days, as it is addictive. It can also dry out your mucous membranes and cause elevated blood pressure. This is especially useful if you are flying.    Use Flonase 1-2 sprays/nostril per day. It is a local acting steroid nasal spray, if you develop a bloody nose, stop using the medication immediately.    Sometimes Nyquil at night is beneficial to help you get some rest, however it is sedating and it  does have an antihistamine, and tylenol.    Honey is a natural cough suppressant that can be used.    Tylenol up to 4,000 mg a day is safe for short periods and can be used for body aches, pain, and fever. However in high doses and prolonged use it can cause liver irritation.    Ibuprofen is a non-steroidal anti-inflammatory that can be used for body aches, pain, and fever.However it can also cause stomach irritation if over used.     Follow up with your PCP or specialty clinic as instructed in the next 2-3 days if not improved or as needed. You can call [phone] to schedule an appointment with appropriate provider.      If you condition worsens, we recommend that you receive another evaluation at the emergency room immediately or contact your primary medical clinic's after hours call service to discuss your concerns.      Please return here or go to the Emergency Department for any concerns or worsening condition.   You can also call [phone] to schedule an appointment with the appropriate provider.    Please return here or go to the Emergency Department for any concerns or worsening of condition.    Thank you for choosing [facility]!    Our goal in the Urgent Care is to always provide outstanding medical care. You may receive a survey by mail or e-mail in the next week regarding your experience today. We would greatly appreciate you completing and returning the survey. Your feedback provides us with a way to recognize our staff who provide very good care, and it helps us learn how to improve when your experience was below our aspiration of excellence.      We appreciate you trusting us with your medical care. We hope you feel better soon. We will be happy to take care of you for all of your future medical needs.    Sincerely,    AUGUST Summers  Cough   If your condition worsens or fails to improve we recommend that you receive another evaluation at the ER immediately or contact your PCP  "to discuss your concerns or return here. You must understand that you've received an urgent care treatment only and that you may be released before all your medical problems are known or treated. You the patient will arrange for follwp care as instructed. .  Rest and fluids are important  Can use honey with ant to soothe your throat  Take prescription cough meds (pills) as prescribed; take prescription cough syrup at night as needed for cough.  Do not take both the prescribed cough pills and syrup at the same time or within 6 hours of each other.  Do not take the cough syrup with any other sedative medication as it can can cause drowsiness. Do not operate any heavy machinery, drink or drive while taking the cough syrup.   -  Flonase (fluticasone) is a nasal spray which is available over the counter and may help with your symptoms.   -  If you have hypertension avoid using the "D" which is the decongestant.  Instead you can use Coricidin HBP for cold and cough symptoms.    -  If you just have drainage you can take plain Zyrtec, Claritin or Allegra   -  Tylenol or ibuprofen can also be used as directed for pain unless you have an allergy to them or medical condition such as stomach ulcers, kidney or liver disease or blood thinners etc for which you should not be taking these type of medications.   Please follow up with your primary care doctor or specialist in the next 48-72hrs as needed and if no improvement  If you  smoke, please stop smoking.    "

## 2024-05-12 ENCOUNTER — OFFICE VISIT (OUTPATIENT)
Dept: URGENT CARE | Facility: CLINIC | Age: 54
End: 2024-05-12
Payer: MEDICAID

## 2024-05-12 VITALS
OXYGEN SATURATION: 96 % | WEIGHT: 185 LBS | HEIGHT: 63 IN | SYSTOLIC BLOOD PRESSURE: 125 MMHG | DIASTOLIC BLOOD PRESSURE: 82 MMHG | TEMPERATURE: 101 F | BODY MASS INDEX: 32.78 KG/M2 | HEART RATE: 99 BPM | RESPIRATION RATE: 18 BRPM

## 2024-05-12 DIAGNOSIS — R50.9 FEVER, UNSPECIFIED FEVER CAUSE: ICD-10-CM

## 2024-05-12 DIAGNOSIS — J34.9 SINUS PROBLEM: ICD-10-CM

## 2024-05-12 DIAGNOSIS — U07.1 COVID: Primary | ICD-10-CM

## 2024-05-12 DIAGNOSIS — Z76.89 ENCOUNTER TO ESTABLISH CARE: ICD-10-CM

## 2024-05-12 LAB
CTP QC/QA: YES
CTP QC/QA: YES
POC MOLECULAR INFLUENZA A AGN: NEGATIVE
POC MOLECULAR INFLUENZA B AGN: NEGATIVE
SARS-COV-2 RDRP RESP QL NAA+PROBE: POSITIVE

## 2024-05-12 PROCEDURE — 87502 INFLUENZA DNA AMP PROBE: CPT | Mod: QW,S$GLB,,

## 2024-05-12 PROCEDURE — 87635 SARS-COV-2 COVID-19 AMP PRB: CPT | Mod: QW,S$GLB,,

## 2024-05-12 PROCEDURE — 99214 OFFICE O/P EST MOD 30 MIN: CPT | Mod: S$GLB,,,

## 2024-05-12 RX ORDER — PROMETHAZINE HYDROCHLORIDE AND DEXTROMETHORPHAN HYDROBROMIDE 6.25; 15 MG/5ML; MG/5ML
5 SYRUP ORAL EVERY 6 HOURS PRN
Qty: 150 ML | Refills: 0 | Status: SHIPPED | OUTPATIENT
Start: 2024-05-12 | End: 2024-05-22

## 2024-05-12 RX ORDER — ACETAMINOPHEN 500 MG
1000 TABLET ORAL
Status: COMPLETED | OUTPATIENT
Start: 2024-05-12 | End: 2024-05-12

## 2024-05-12 RX ORDER — FLUTICASONE PROPIONATE 50 MCG
2 SPRAY, SUSPENSION (ML) NASAL DAILY
Qty: 16 G | Refills: 0 | Status: SHIPPED | OUTPATIENT
Start: 2024-05-12

## 2024-05-12 RX ORDER — CETIRIZINE HYDROCHLORIDE 10 MG/1
10 TABLET ORAL DAILY
Qty: 30 TABLET | Refills: 0 | Status: SHIPPED | OUTPATIENT
Start: 2024-05-12

## 2024-05-12 RX ORDER — BENZONATATE 200 MG/1
200 CAPSULE ORAL 3 TIMES DAILY PRN
Qty: 30 CAPSULE | Refills: 0 | Status: SHIPPED | OUTPATIENT
Start: 2024-05-12 | End: 2024-05-22

## 2024-05-12 RX ADMIN — Medication 1000 MG: at 10:05

## 2024-05-12 NOTE — PROGRESS NOTES
"Subjective:      Patient ID: Danyelle Garcia is a 53 y.o. female.    Vitals:  height is 5' 3" (1.6 m) and weight is 83.9 kg (185 lb). Her oral temperature is 101.4 °F (38.6 °C) (abnormal). Her blood pressure is 125/82 and her pulse is 99. Her respiration is 18 and oxygen saturation is 96%.     Chief Complaint: Sinus Problem    Patient presents with sinus pressure.  Associated symptoms include sore throat, cough, fever, chills.  Onset was 5 days ago.  She is taken over-the-counter medications with mild improvement of symptoms.  She denies chest pain, SOB, nausea, vomiting, abdominal pain.    Sinus Problem  This is a new problem. The current episode started in the past 7 days. The problem has been gradually worsening since onset. The maximum temperature recorded prior to her arrival was 101 - 101.9 F. She is experiencing no pain. Associated symptoms include chills, congestion, coughing, ear pain, headaches, sinus pressure and a sore throat. Pertinent negatives include no diaphoresis, hoarse voice, neck pain, shortness of breath, sneezing or swollen glands. Past treatments include acetaminophen (nyquil, dayquil, and hot tea). The treatment provided mild relief.       Constitution: Positive for chills and fever. Negative for sweating.   HENT:  Positive for ear pain, congestion, sinus pressure and sore throat.    Neck: Negative for neck pain.   Cardiovascular:  Negative for chest pain and sob on exertion.   Respiratory:  Positive for cough and sputum production. Negative for shortness of breath.    Gastrointestinal:  Negative for abdominal pain, nausea, vomiting and diarrhea.   Musculoskeletal:  Positive for muscle ache.   Skin:  Negative for rash.   Allergic/Immunologic: Negative for sneezing.   Neurological:  Positive for headaches.      Objective:     Physical Exam   Constitutional:  Non-toxic appearance. She does not appear ill. No distress.      Comments:Patient is in no acute distress, patient is non-toxic appearing, " patient is ox3, patient is answering question appropriately.     HENT:   Head: Normocephalic.   Ears:   Right Ear: Tympanic membrane, external ear and ear canal normal. no impacted cerumen  Left Ear: Tympanic membrane, external ear and ear canal normal. no impacted cerumen  Nose: Congestion present. No rhinorrhea. Right sinus exhibits no maxillary sinus tenderness and no frontal sinus tenderness. Left sinus exhibits no maxillary sinus tenderness and no frontal sinus tenderness.   Mouth/Throat: Posterior oropharyngeal erythema present. No oropharyngeal exudate. Oropharynx is clear.      Comments: No drooling, no tripoding. Patient is able to handle secretions. Patient appears to be in no acute respiratory distress. Airway is intact. Patient is able to talk in complete sentences without discomfort.  No drooling, no tripoding. Patient is able to handle secretions. Patient appears to be in no acute respiratory distress. Airway is intact. Patient is able to talk in complete sentences without discomfort.      Comments: No drooling, no tripoding. Patient is able to handle secretions. Patient appears to be in no acute respiratory distress. Airway is intact. Patient is able to talk in complete sentences without discomfort.  Cardiovascular: Normal rate, regular rhythm and normal heart sounds.   No murmur heard.Exam reveals no gallop and no friction rub.   Pulmonary/Chest: Effort normal. No stridor. No respiratory distress. She has no wheezes. She has no rhonchi. She has no rales. She exhibits no tenderness.         Comments: Patient is in no respiratory distress. Breath sounds even, unlabored, and clear to auscultation bilaterally. No accessory muscle usage. Patient able to speak in complete sentences with ease.    Abdominal: Normal appearance.   Lymphadenopathy:     She has cervical adenopathy.   Neurological: She is alert.   Skin: Skin is not diaphoretic.   Nursing note and vitals reviewed.    Results for orders placed or  performed in visit on 05/12/24   POCT COVID-19 Rapid Screening   Result Value Ref Range    POC Rapid COVID Positive (A) Negative     Acceptable Yes    POCT Influenza A/B MOLECULAR   Result Value Ref Range    POC Molecular Influenza A Ag Negative Negative    POC Molecular Influenza B Ag Negative Negative     Acceptable Yes      Assessment:     1. COVID    2. Sinus problem    3. Fever, unspecified fever cause    4. Encounter to establish care      Plan:   Previous notes reviewed.  Vital signs reviewed.  Labs ordered. Labs reviewed.  Discussed COVID with systemic symptoms, home care, tx options, and given follow up precautions.  Patient was briefed on my thought process and diagnosis.   Patient involved with the treatment plan and agreed to the plan.  Patient informed on warning signs, patient understood warning signs and to go to urgent care or ER if warning signs appear.  Please excuse grammatical/spelling errors appreciated throughout this visit encounter for a remote dictation device was used during this encounter.    Patient Instructions   You may return to work/school if you are fever (100.4 or greater) free for 24 hours without the use of fever reducing medications AND noticing improvement of symptoms.    Please take PAXLOVID for COVID.  Please take CETIRIZINE for allergy relief.  Please take FLONASE for nasal/sinus congestion.  Please take TESSALON during the day for cough.  Please take PROMETHAZINE DM at night for cough.   You were prescribed Promethazine DM, this medication can make you drowsy, please avoid driving or operating heavy machinery while taking this medication.     You were given a referral to INTERNAL MEDICINE (primary care) and [facility], please call [phone] for scheduling and appointments.     Please return here or go to the Emergency Department for any concerns or worsening of condition.    Please drink plenty of fluids.  Please get plenty of rest.  Please  utilize saltwater gargles for sore throat.  Please utilize warm tea, and lemon for sore throat relief.  Please utilize over-the-counter throat numbing spray and lozenges for sore throat relief.    Nasal irrigation with a saline spray or Netti Pot like device per their directions is also recommended.  If not allergic, please take over the counter Tylenol (Acetaminophen) and/or Motrin (Ibuprofen) as directed for control of pain and/or fever.  Please follow up with your primary care doctor or specialist as needed.    If you  smoke, please stop smoking.    COVID  -     benzonatate (TESSALON) 200 MG capsule; Take 1 capsule (200 mg total) by mouth 3 (three) times daily as needed for Cough.  Dispense: 30 capsule; Refill: 0  -     promethazine-dextromethorphan (PROMETHAZINE-DM) 6.25-15 mg/5 mL Syrp; Take 5 mLs by mouth every 6 (six) hours as needed (cough).  Dispense: 150 mL; Refill: 0  -     fluticasone propionate (FLONASE) 50 mcg/actuation nasal spray; 2 sprays (100 mcg total) by Each Nostril route once daily.  Dispense: 16 g; Refill: 0  -     cetirizine (ZYRTEC) 10 MG tablet; Take 1 tablet (10 mg total) by mouth once daily.  Dispense: 30 tablet; Refill: 0  -     nirmatrelvir-ritonavir 300 mg (150 mg x 2)-100 mg copackaged tablets (EUA); Take 3 tablets by mouth 2 (two) times daily for 5 days. Each dose contains 2 nirmatrelvir (pink tablets) and 1 ritonavir (white tablet). Take all 3 tablets together  Dispense: 30 tablet; Refill: 0    Sinus problem  -     POCT COVID-19 Rapid Screening  -     POCT Influenza A/B MOLECULAR    Fever, unspecified fever cause  -     acetaminophen tablet 1,000 mg    Encounter to establish care  -     Ambulatory referral/consult to Internal Medicine  -     [facility]

## 2024-05-12 NOTE — PATIENT INSTRUCTIONS
You may return to work/school if you are fever (100.4 or greater) free for 24 hours without the use of fever reducing medications AND noticing improvement of symptoms.    Please take PAXLOVID for COVID.  Please take CETIRIZINE for allergy relief.  Please take FLONASE for nasal/sinus congestion.  Please take TESSALON during the day for cough.  Please take PROMETHAZINE DM at night for cough.   You were prescribed Promethazine DM, this medication can make you drowsy, please avoid driving or operating heavy machinery while taking this medication.     You were given a referral to INTERNAL MEDICINE (primary care) and [facility], please call [phone] for scheduling and appointments.     Please return here or go to the Emergency Department for any concerns or worsening of condition.    Please drink plenty of fluids.  Please get plenty of rest.  Please utilize saltwater gargles for sore throat.  Please utilize warm tea, and lemon for sore throat relief.  Please utilize over-the-counter throat numbing spray and lozenges for sore throat relief.    Nasal irrigation with a saline spray or Netti Pot like device per their directions is also recommended.  If not allergic, please take over the counter Tylenol (Acetaminophen) and/or Motrin (Ibuprofen) as directed for control of pain and/or fever.  Please follow up with your primary care doctor or specialist as needed.    If you  smoke, please stop smoking.

## 2024-10-16 ENCOUNTER — OFFICE VISIT (OUTPATIENT)
Dept: URGENT CARE | Facility: CLINIC | Age: 54
End: 2024-10-16
Payer: MEDICAID

## 2024-10-16 VITALS
HEART RATE: 96 BPM | BODY MASS INDEX: 34.42 KG/M2 | WEIGHT: 194.25 LBS | HEIGHT: 63 IN | RESPIRATION RATE: 12 BRPM | OXYGEN SATURATION: 96 % | TEMPERATURE: 99 F | DIASTOLIC BLOOD PRESSURE: 78 MMHG | SYSTOLIC BLOOD PRESSURE: 116 MMHG

## 2024-10-16 DIAGNOSIS — H60.391 OTHER INFECTIVE ACUTE OTITIS EXTERNA OF RIGHT EAR: Primary | ICD-10-CM

## 2024-10-16 PROCEDURE — 99213 OFFICE O/P EST LOW 20 MIN: CPT | Mod: S$GLB,,, | Performed by: FAMILY MEDICINE

## 2024-10-16 RX ORDER — IBUPROFEN 800 MG/1
800 TABLET ORAL 3 TIMES DAILY
Qty: 20 TABLET | Refills: 0 | Status: SHIPPED | OUTPATIENT
Start: 2024-10-16

## 2024-10-16 RX ORDER — CIPROFLOXACIN AND DEXAMETHASONE 3; 1 MG/ML; MG/ML
4 SUSPENSION/ DROPS AURICULAR (OTIC) 2 TIMES DAILY
Qty: 7.5 ML | Refills: 0 | Status: SHIPPED | OUTPATIENT
Start: 2024-10-16 | End: 2024-10-23

## 2024-10-16 NOTE — PROGRESS NOTES
"Subjective:      Patient ID: Danyelle Garcia is a 54 y.o. female.    Vitals:  height is 5' 3" (1.6 m) and weight is 88.1 kg (194 lb 3.6 oz). Her oral temperature is 98.8 °F (37.1 °C). Her blood pressure is 116/78 and her pulse is 96. Her respiration is 12 and oxygen saturation is 96%.     Chief Complaint: Otalgia    Otalgia   There is pain in the right ear. This is a new problem. Episode onset: 4 days ago. There has been no fever. The pain is moderate. Associated symptoms include headaches. She has tried acetaminophen for the symptoms. The treatment provided mild relief. There is no history of a chronic ear infection, hearing loss or a tympanostomy tube.       HENT:  Positive for ear pain.    Neurological:  Positive for headaches.      Objective:     Physical Exam   Constitutional: She is oriented to person, place, and time.   HENT:   Head: Normocephalic.   Ears:   Right Ear: External ear normal. There is swelling and tenderness. Tympanic membrane is not perforated, not erythematous, not retracted and not bulging. A middle ear effusion is present.   Left Ear: External ear normal.   Nose: Nose normal.   Mouth/Throat: Mucous membranes are moist. Tonsils are 3+ on the right. Tonsils are 2+ on the left. No tonsillar exudate.   Eyes: Conjunctivae are normal.   Cardiovascular: Normal rate.   Pulmonary/Chest: Effort normal.   Musculoskeletal: Normal range of motion.         General: Normal range of motion.   Neurological: She is alert and oriented to person, place, and time.   Skin: Skin is dry.   Psychiatric: Her behavior is normal.       Assessment:     1. Other infective acute otitis externa of right ear        Plan:       Other infective acute otitis externa of right ear  -     ibuprofen (ADVIL,MOTRIN) 800 MG tablet; Take 1 tablet (800 mg total) by mouth 3 (three) times daily.  Dispense: 20 tablet; Refill: 0  -     ciprofloxacin-dexAMETHasone 0.3-0.1% (CIPRODEX) 0.3-0.1 % DrpS; Place 4 drops into the right ear 2 (two) " times daily. for 7 days  Dispense: 7.5 mL; Refill: 0    Pain radiates out from eat to right temporal area and causea headache. Will treat as above.   Rtc prn

## 2025-01-16 ENCOUNTER — OFFICE VISIT (OUTPATIENT)
Dept: URGENT CARE | Facility: CLINIC | Age: 55
End: 2025-01-16
Payer: MEDICAID

## 2025-01-16 VITALS
RESPIRATION RATE: 21 BRPM | OXYGEN SATURATION: 97 % | WEIGHT: 196.88 LBS | HEIGHT: 63 IN | BODY MASS INDEX: 34.88 KG/M2 | HEART RATE: 96 BPM | DIASTOLIC BLOOD PRESSURE: 76 MMHG | TEMPERATURE: 99 F | SYSTOLIC BLOOD PRESSURE: 123 MMHG

## 2025-01-16 DIAGNOSIS — R05.9 COUGH, UNSPECIFIED TYPE: ICD-10-CM

## 2025-01-16 DIAGNOSIS — J06.9 VIRAL URI: Primary | ICD-10-CM

## 2025-01-16 DIAGNOSIS — R09.81 SINUS CONGESTION: ICD-10-CM

## 2025-01-16 LAB
CTP QC/QA: YES
CTP QC/QA: YES
POC MOLECULAR INFLUENZA A AGN: NEGATIVE
POC MOLECULAR INFLUENZA B AGN: NEGATIVE
SARS-COV-2 AG RESP QL IA.RAPID: NEGATIVE

## 2025-01-16 PROCEDURE — 87811 SARS-COV-2 COVID19 W/OPTIC: CPT | Mod: QW,S$GLB,,

## 2025-01-16 PROCEDURE — 87502 INFLUENZA DNA AMP PROBE: CPT | Mod: QW,S$GLB,,

## 2025-01-16 PROCEDURE — 99214 OFFICE O/P EST MOD 30 MIN: CPT | Mod: S$GLB,,,

## 2025-01-16 RX ORDER — AMOXICILLIN 875 MG/1
875 TABLET, FILM COATED ORAL EVERY 12 HOURS
Qty: 10 TABLET | Refills: 0 | Status: SHIPPED | OUTPATIENT
Start: 2025-01-16 | End: 2025-01-21

## 2025-01-16 RX ORDER — FLUTICASONE PROPIONATE 50 MCG
1 SPRAY, SUSPENSION (ML) NASAL DAILY
Qty: 9.9 ML | Refills: 0 | Status: SHIPPED | OUTPATIENT
Start: 2025-01-16 | End: 2025-01-23

## 2025-01-16 RX ORDER — PSEUDOEPHEDRINE HCL 30 MG
30 TABLET ORAL EVERY 6 HOURS PRN
Qty: 28 TABLET | Refills: 0 | Status: SHIPPED | OUTPATIENT
Start: 2025-01-16 | End: 2025-01-23

## 2025-01-16 NOTE — LETTER
January 16, 2025      [facility]  [address], [address]  [place] [address]  Phone: [phone]  Fax: [phone]       Patient: Danyelle Garcia   YOB: 1970  Date of Visit: 01/16/2025    To Whom It May Concern:    Aric Garcia  was at [facility] on 01/16/2025. The patient may return to work on 1/20/25 with no restrictions. If you have any questions or concerns, or if I can be of further assistance, please do not hesitate to contact me.    Sincerely,          Kasandra Hurtado NP

## 2025-01-16 NOTE — PATIENT INSTRUCTIONS
Your illness is caused by a virus and antibiotics would not be beneficial at this time.    Please drink plenty of fluids and get plenty of rest.    For Congestion:     --  Take over the counter antihistamine such as Claritin, Zyrtec or Allegra to dry you out.     --  Use pseudoephedrine (from behind the pharmacy counter) for decongestant.  You may start with a low dose (30 mg) with a max dose of 240 mg /day.  This medication may raise your blood pressure and give you palpitations, if this occurs, please lower your daily dose or stop taking the medication.     --  Use Flonase 2 sprays/nostril twice per day. It is a local acting steroid nasal spray and will take 3-4 days to work at full strength so please use this consistently.  If you develop a bloody nose, stop using the medication immediately.      For Cough:   --  Use Mucinex (guaifenisin) to break up mucous up to 2400mg/day to loosen any mucous. The Mucinex DM pill has a cough suppressant that can be sedating. It can be used at night to stop the tickle at the back of your throat.      --  You can use Mucinex D (it has guaifenesin and a high dose of pseudoephedrine) in the mornings as a combination medication to break up mucus and help with congestion.    --  May gargle salt water for sore throat, a tablespoon of honey is helpful for cough, especially at night.     If not allergic, please take over the counter Tylenol (Acetaminophen) and/or Motrin (Ibuprofen) as directed for control of pain and/or fever.    Please follow up with your primary care doctor or specialist as needed.    If you  smoke, please stop smoking.

## 2025-01-16 NOTE — PROGRESS NOTES
"Subjective:      Patient ID: Danyelle Garcia is a 54 y.o. female.    Vitals:  height is 5' 3" (1.6 m) and weight is 89.3 kg (196 lb 13.9 oz). Her oral temperature is 98.9 °F (37.2 °C). Her blood pressure is 123/76 and her pulse is 96. Her respiration is 21 (abnormal) and oxygen saturation is 97%.     Chief Complaint: Sinus Problem    Pt is here for sinus congestion, sore throat, right ear ache, and runny right eye.  Patient states her symptoms started 4 days ago, however the congestion continues to get worse, she has been taking NyQuil and Mucinex for her symptoms.  She denies any fever, cough, shortness for breath, chest pain, or GI concerns.    Sinus Problem  This is a new problem. Episode onset: 4 days ago. The problem is unchanged. There has been no fever. Her pain is at a severity of 5/10. The pain is mild. Associated symptoms include congestion, coughing, ear pain, headaches and a sore throat. Pertinent negatives include no neck pain or shortness of breath. Treatments tried: otc cough medicine. The treatment provided no relief.       Constitution: Negative for fever and generalized weakness.   HENT:  Positive for ear pain, congestion and sore throat. Negative for sinus pain.    Neck: Negative for neck pain.   Cardiovascular:  Negative for chest pain.   Respiratory:  Positive for cough. Negative for shortness of breath.    Gastrointestinal:  Negative for abdominal pain.   Neurological:  Positive for headaches.      Objective:     Physical Exam   Constitutional: She is oriented to person, place, and time. She appears well-developed. She is cooperative.  Non-toxic appearance. She does not appear ill. No distress.      Comments:Tired appearing, but awake and answering questions appropriately     HENT:   Head: Normocephalic and atraumatic.   Ears:   Right Ear: Hearing, external ear and ear canal normal. A middle ear effusion (Serous) is present.   Left Ear: Hearing, external ear and ear canal normal. A middle ear " effusion (Serous) is present.   Nose: Rhinorrhea present. No mucosal edema or nasal deformity. No epistaxis. Right sinus exhibits no maxillary sinus tenderness and no frontal sinus tenderness. Left sinus exhibits no maxillary sinus tenderness and no frontal sinus tenderness.   Mouth/Throat: Uvula is midline, oropharynx is clear and moist and mucous membranes are normal. No trismus in the jaw. Normal dentition. No uvula swelling. No oropharyngeal exudate, posterior oropharyngeal edema or posterior oropharyngeal erythema.   Eyes: Conjunctivae and lids are normal. Pupils are equal, round, and reactive to light. No scleral icterus.      Comments: Watery eyes bilaterally, worse on the right   Neck: Trachea normal and phonation normal. Neck supple. No edema present. No erythema present. No neck rigidity present.   Cardiovascular: Normal rate, regular rhythm, normal heart sounds and normal pulses.   Pulmonary/Chest: Effort normal and breath sounds normal. No respiratory distress. She has no decreased breath sounds. She has no rhonchi.   Breath sounds clear bilaterally         Comments: Breath sounds clear bilaterally    Abdominal: Normal appearance.   Musculoskeletal: Normal range of motion.         General: No deformity. Normal range of motion.   Neurological: She is alert and oriented to person, place, and time. She exhibits normal muscle tone. Coordination normal.   Skin: Skin is warm, dry, intact, not diaphoretic and not pale.   Psychiatric: Her speech is normal and behavior is normal. Judgment and thought content normal.   Nursing note and vitals reviewed.      Assessment:     1. Viral URI    2. Cough, unspecified type    3. Sinus congestion        Plan:   Physical exam as documented above, no clinical evidence of respiratory failure, dehydration, or systemic bacterial infection at this time. Anticipatory guidance regarding viral URI's discussed with patient.  I do have concern for developing bacterial sinusitis at  this time based on history and physical exam, discussed with patient to increase her use of decongestants, specifically low-dose Sudafed, however we will prescribe amoxicillin to be used if symptoms worsen after 3-4 days.  I discussed the watch and wait approach to antibiotics with the patient, and she agreed with this plan of care.  She will wait to use the antibiotics if symptoms worsen even with use of appropriate decongestants.  Discussed use of over-the-counter medications, such as Sudafed and Mucinez-D, for symptoms as well as supportive care and return precautions. Patient verbalizes understanding and agrees to follow-up with PCP as needed.     Results for orders placed or performed in visit on 01/16/25   POCT Influenza A/B MOLECULAR    Collection Time: 01/16/25  4:31 PM   Result Value Ref Range    POC Molecular Influenza A Ag Negative Negative    POC Molecular Influenza B Ag Negative Negative     Acceptable Yes    SARS Coronavirus 2 Antigen, POCT Manual Read    Collection Time: 01/16/25  4:32 PM   Result Value Ref Range    SARS Coronavirus 2 Antigen Negative Negative     Acceptable Yes          Viral URI    Cough, unspecified type  -     POCT Influenza A/B MOLECULAR  -     SARS Coronavirus 2 Antigen, POCT Manual Read    Sinus congestion  -     pseudoephedrine (SUDAFED) 30 MG tablet; Take 1 tablet (30 mg total) by mouth every 6 (six) hours as needed for Congestion.  Dispense: 28 tablet; Refill: 0    Other orders  -     amoxicillin (AMOXIL) 875 MG tablet; Take 1 tablet (875 mg total) by mouth every 12 (twelve) hours. for 5 days  Dispense: 10 tablet; Refill: 0  -     fluticasone propionate (FLONASE) 50 mcg/actuation nasal spray; 1 spray (50 mcg total) by Each Nostril route once daily. for 7 days  Dispense: 9.9 mL; Refill: 0

## 2025-05-18 ENCOUNTER — OFFICE VISIT (OUTPATIENT)
Dept: URGENT CARE | Facility: CLINIC | Age: 55
End: 2025-05-18
Payer: MEDICAID

## 2025-05-18 VITALS
TEMPERATURE: 98 F | HEIGHT: 63 IN | BODY MASS INDEX: 33.66 KG/M2 | HEART RATE: 103 BPM | RESPIRATION RATE: 18 BRPM | OXYGEN SATURATION: 98 % | DIASTOLIC BLOOD PRESSURE: 83 MMHG | SYSTOLIC BLOOD PRESSURE: 151 MMHG | WEIGHT: 190 LBS

## 2025-05-18 DIAGNOSIS — R05.9 COUGH, UNSPECIFIED TYPE: ICD-10-CM

## 2025-05-18 DIAGNOSIS — R09.81 SINUS CONGESTION: ICD-10-CM

## 2025-05-18 DIAGNOSIS — U07.1 COVID-19: Primary | ICD-10-CM

## 2025-05-18 LAB
CTP QC/QA: YES
CTP QC/QA: YES
POC MOLECULAR INFLUENZA A AGN: NEGATIVE
POC MOLECULAR INFLUENZA B AGN: NEGATIVE
SARS-COV+SARS-COV-2 AG RESP QL IA.RAPID: POSITIVE

## 2025-05-18 PROCEDURE — 87502 INFLUENZA DNA AMP PROBE: CPT | Mod: QW,S$GLB,,

## 2025-05-18 PROCEDURE — 87811 SARS-COV-2 COVID19 W/OPTIC: CPT | Mod: QW,S$GLB,,

## 2025-05-18 PROCEDURE — 99214 OFFICE O/P EST MOD 30 MIN: CPT | Mod: S$GLB,,,

## 2025-05-18 RX ORDER — PROMETHAZINE HYDROCHLORIDE AND DEXTROMETHORPHAN HYDROBROMIDE 6.25; 15 MG/5ML; MG/5ML
5 SYRUP ORAL NIGHTLY PRN
Qty: 118 ML | Refills: 0 | Status: SHIPPED | OUTPATIENT
Start: 2025-05-18 | End: 2025-05-28

## 2025-05-18 RX ORDER — BENZONATATE 200 MG/1
200 CAPSULE ORAL 3 TIMES DAILY PRN
Qty: 21 CAPSULE | Refills: 0 | Status: SHIPPED | OUTPATIENT
Start: 2025-05-18 | End: 2025-05-25

## 2025-05-18 NOTE — PATIENT INSTRUCTIONS
Your illness is caused by a virus and antibiotics would not be beneficial at this time.    Please drink plenty of fluids and get plenty of rest.    For Congestion:     --  Take over the counter antihistamine such as Claritin, Zyrtec or Allegra to dry you out.     --  Use pseudoephedrine (from behind the pharmacy counter) for decongestant.  You may start with a low dose (30 mg) with a max dose of 240 mg /day.  This medication may raise your blood pressure and give you palpitations, if this occurs, please lower your daily dose or stop taking the medication.     --  Use Flonase 2 sprays/nostril twice per day. It is a local acting steroid nasal spray and will take 3-4 days to work at full strength so please use this consistently.  If you develop a bloody nose, stop using the medication immediately.      For Cough:   --  Use Mucinex (guaifenisin) to break up mucous up to 2400mg/day to loosen any mucous. The Mucinex DM pill has a cough suppressant that can be sedating. It can be used at night to stop the tickle at the back of your throat.      --  You can use Mucinex D (it has guaifenesin and a high dose of pseudoephedrine) in the mornings as a combination medication to break up mucus and help with congestion.    --  May gargle salt water for sore throat, a tablespoon of honey is helpful for cough, especially at night.     If not allergic, please take over the counter Tylenol (Acetaminophen) and/or Motrin (Ibuprofen) as directed for control of pain and/or fever.    Please follow up with your primary care doctor or specialist as needed.    - You must understand that you have received an Urgent Care treatment only and that you may be released before all of your medical problems are known or treated.   - You, the patient, will arrange for follow up care as instructed.   - If your condition worsens or fails to improve we recommend that you receive another evaluation at the ER immediately or contact your PCP to discuss your  concerns or return here.   - Follow up with your PCP or specialty clinic as directed in the next 1-2 weeks if not improved or as needed.  You can call [phone] to schedule an appointment with the appropriate provider.      If your symptoms do not improve or worsen, go to the emergency room immediately.

## 2025-05-18 NOTE — PROGRESS NOTES
"Subjective:      Patient ID: Danyelle Garcia is a 54 y.o. female.    Vitals:  height is 5' 3" (1.6 m) and weight is 86.2 kg (190 lb). Her temperature is 98.4 °F (36.9 °C). Her blood pressure is 151/83 (abnormal) and her pulse is 103. Her respiration is 18 and oxygen saturation is 98%.     Chief Complaint: Sinus Problem    Patient is a 54-year-old female with complaint of cough, congestion, tactile fever and body aches for 4-5 days.  Patient has been treating with over-the-counter medications.  Denies any chest pain, shortness for breath, GI symptoms, or other concerns.    Sinus Problem  This is a new problem. The current episode started in the past 7 days (4 days). The problem has been gradually worsening since onset. Her pain is at a severity of 2/10. The pain is mild. Associated symptoms include chills, congestion, coughing, diaphoresis and a sore throat. Pertinent negatives include no ear pain, headaches, hoarse voice, neck pain, shortness of breath, sinus pressure, sneezing or swollen glands. Past treatments include oral decongestants. The treatment provided mild relief.       Constitution: Positive for chills, sweating, fatigue and fever. Negative for generalized weakness.   HENT:  Positive for congestion and sore throat. Negative for ear pain, sinus pain and sinus pressure.    Neck: Negative for neck pain.   Cardiovascular:  Negative for chest pain.   Respiratory:  Positive for cough. Negative for sputum production and shortness of breath.    Gastrointestinal:  Negative for abdominal pain.   Musculoskeletal:  Positive for muscle ache.   Allergic/Immunologic: Negative for sneezing.   Neurological:  Negative for headaches.      Objective:     Physical Exam   Constitutional: She is oriented to person, place, and time. She appears well-developed. She is cooperative.  Non-toxic appearance. She does not appear ill. No distress.      Comments:Patient well-appearing, sitting comfortably on exam table.   awake  HENT: "   Head: Normocephalic and atraumatic.   Ears:   Right Ear: Hearing, tympanic membrane, external ear and ear canal normal.   Left Ear: Hearing, tympanic membrane, external ear and ear canal normal.   Nose: Rhinorrhea present. No mucosal edema or nasal deformity. No epistaxis. Right sinus exhibits no maxillary sinus tenderness and no frontal sinus tenderness. Left sinus exhibits no maxillary sinus tenderness and no frontal sinus tenderness.   Mouth/Throat: Uvula is midline and mucous membranes are normal. No trismus in the jaw. Normal dentition. No uvula swelling. Posterior oropharyngeal erythema (Mild) present. No oropharyngeal exudate or posterior oropharyngeal edema.   Eyes: Conjunctivae and lids are normal. No scleral icterus.   Neck: Trachea normal and phonation normal. Neck supple. No edema present. No erythema present. No neck rigidity present.   Cardiovascular: Normal rate, regular rhythm, normal heart sounds and normal pulses.   Pulmonary/Chest: Effort normal and breath sounds normal. No respiratory distress. She has no decreased breath sounds. She has no rhonchi.   Breath sounds clear bilaterally, dry cough heard during exam         Comments: Breath sounds clear bilaterally, dry cough heard during exam    Abdominal: Normal appearance.   Musculoskeletal: Normal range of motion.         General: No deformity. Normal range of motion.   Neurological: She is alert and oriented to person, place, and time. She exhibits normal muscle tone. Coordination normal.   Skin: Skin is warm, dry, intact, not diaphoretic and not pale.   Psychiatric: Her speech is normal and behavior is normal. Judgment and thought content normal.   Nursing note and vitals reviewed.      Assessment:     1. COVID-19    2. Flu-like symptoms    3. Sinus congestion    4. Cough, unspecified type        Plan:   Patient is very well-appearing, alert and active, VSS, and appears well-hydrated.  Physical exam as documented above, no clinical evidence of  respiratory failure, dehydration, or focal/systemic bacterial infection at this time.  Patient tested positive for Covid-19 in clinic today.  Covid risk score 1, discussed use of prescription anti-viral medication Paxlovid with patient, and they would like a prescription at this time even though they may be out of the most effective time range of the illness.  On chart review, no medical conditions or current medications that a contraindicated with the Paxlovid.  Thoroughly reviewed anticipatory guidance of Covid infection, strict return precautions, supportive care and OTC medication, and home quarantine; patient verbalizes understanding and agrees to follow-up with PCP as needed.     Results for orders placed or performed in visit on 05/18/25   POCT Influenza A/B MOLECULAR    Collection Time: 05/18/25 11:00 AM   Result Value Ref Range    POC Molecular Influenza A Ag Negative Negative    POC Molecular Influenza B Ag Negative Negative     Acceptable Yes    SARS Coronavirus 2 Antigen, POCT Manual Read    Collection Time: 05/18/25 11:09 AM   Result Value Ref Range    SARS Coronavirus 2 Antigen Positive (A) Negative, Presumptive Negative     Acceptable Yes          COVID-19  -     nirmatrelvir-ritonavir 300 mg (150 mg x 2)-100 mg copackaged tablets (EUA); Take 3 tablets by mouth 2 (two) times daily for 5 days. Each dose contains 2 nirmatrelvir (pink tablets) and 1 ritonavir (white tablet). Take all 3 tablets together  Dispense: 30 tablet; Refill: 0    Flu-like symptoms  -     POCT Influenza A/B MOLECULAR    Sinus congestion  -     SARS Coronavirus 2 Antigen, POCT Manual Read    Cough, unspecified type  -     benzonatate (TESSALON) 200 MG capsule; Take 1 capsule (200 mg total) by mouth 3 (three) times daily as needed.  Dispense: 21 capsule; Refill: 0  -     promethazine-dextromethorphan (PROMETHAZINE-DM) 6.25-15 mg/5 mL Syrp; Take 5 mLs by mouth nightly as needed.  Dispense: 118 mL; Refill:  0

## 2025-05-18 NOTE — LETTER
May 18, 2025      [facility]  [address], [address]  [place] [address]  Phone: [phone]  Fax: [phone]       Patient: Danyelle Garcia   YOB: 1970  Date of Visit: 05/18/2025    To Whom It May Concern:    Aric Garcia  was at [facility] on 05/18/2025. The patient may return to work on 5/20/25 with no restrictions. If you have any questions or concerns, or if I can be of further assistance, please do not hesitate to contact me.    Sincerely,          Kasandra Hurtado NP

## 2025-07-23 ENCOUNTER — HOSPITAL ENCOUNTER (EMERGENCY)
Facility: HOSPITAL | Age: 55
Discharge: HOME | End: 2025-07-23
Attending: STUDENT IN AN ORGANIZED HEALTH CARE EDUCATION/TRAINING PROGRAM
Payer: MEDICAID

## 2025-07-23 VITALS
RESPIRATION RATE: 12 BRPM | HEART RATE: 63 BPM | OXYGEN SATURATION: 97 % | WEIGHT: 185 LBS | TEMPERATURE: 98 F | SYSTOLIC BLOOD PRESSURE: 104 MMHG | DIASTOLIC BLOOD PRESSURE: 64 MMHG | HEIGHT: 67 IN | BODY MASS INDEX: 29.03 KG/M2

## 2025-07-23 DIAGNOSIS — R07.9 CHEST PAIN: ICD-10-CM

## 2025-07-23 LAB
ABSOLUTE EOSINOPHIL (OHS): 0.21 K/UL
ABSOLUTE MONOCYTE (OHS): 0.49 K/UL (ref 0.3–1)
ABSOLUTE NEUTROPHIL COUNT (OHS): 4.21 K/UL (ref 1.8–7.7)
ALBUMIN SERPL BCP-MCNC: 3.4 G/DL (ref 3.5–5.2)
ALP SERPL-CCNC: 99 UNIT/L (ref 40–150)
ALT SERPL W/O P-5'-P-CCNC: 38 UNIT/L (ref 10–44)
ANION GAP (OHS): 12 MMOL/L (ref 8–16)
AST SERPL-CCNC: 39 UNIT/L (ref 11–45)
BASOPHILS # BLD AUTO: 0.05 K/UL
BASOPHILS NFR BLD AUTO: 0.5 %
BILIRUB SERPL-MCNC: 0.3 MG/DL (ref 0.1–1)
BNP SERPL-MCNC: 10 PG/ML (ref 0–99)
BUN SERPL-MCNC: 14 MG/DL (ref 6–20)
CALCIUM SERPL-MCNC: 9 MG/DL (ref 8.7–10.5)
CHLORIDE SERPL-SCNC: 105 MMOL/L (ref 95–110)
CO2 SERPL-SCNC: 25 MMOL/L (ref 23–29)
CREAT SERPL-MCNC: 1 MG/DL (ref 0.5–1.4)
ERYTHROCYTE [DISTWIDTH] IN BLOOD BY AUTOMATED COUNT: 12.9 % (ref 11.5–14.5)
GFR SERPLBLD CREATININE-BSD FMLA CKD-EPI: >60 ML/MIN/1.73/M2
GLUCOSE SERPL-MCNC: 117 MG/DL (ref 70–110)
HCT VFR BLD AUTO: 39.6 % (ref 37–48.5)
HGB BLD-MCNC: 12.5 GM/DL (ref 12–16)
HOLD SPECIMEN: NORMAL
IMM GRANULOCYTES # BLD AUTO: 0.02 K/UL (ref 0–0.04)
IMM GRANULOCYTES NFR BLD AUTO: 0.2 % (ref 0–0.5)
LIPASE SERPL-CCNC: 24 U/L (ref 4–60)
LYMPHOCYTES # BLD AUTO: 4.27 K/UL (ref 1–4.8)
MCH RBC QN AUTO: 27.9 PG (ref 27–31)
MCHC RBC AUTO-ENTMCNC: 31.6 G/DL (ref 32–36)
MCV RBC AUTO: 88 FL (ref 82–98)
NUCLEATED RBC (/100WBC) (OHS): 0 /100 WBC
PLATELET # BLD AUTO: 268 K/UL (ref 150–450)
PMV BLD AUTO: 10.4 FL (ref 9.2–12.9)
POTASSIUM SERPL-SCNC: 3.6 MMOL/L (ref 3.5–5.1)
PROT SERPL-MCNC: 8.2 GM/DL (ref 6–8.4)
RBC # BLD AUTO: 4.48 M/UL (ref 4–5.4)
RELATIVE EOSINOPHIL (OHS): 2.3 %
RELATIVE LYMPHOCYTE (OHS): 46.2 % (ref 18–48)
RELATIVE MONOCYTE (OHS): 5.3 % (ref 4–15)
RELATIVE NEUTROPHIL (OHS): 45.5 % (ref 38–73)
SODIUM SERPL-SCNC: 142 MMOL/L (ref 136–145)
TROPONIN I SERPL DL<=0.01 NG/ML-MCNC: 0.01 NG/ML
TROPONIN I SERPL DL<=0.01 NG/ML-MCNC: <0.006 NG/ML
WBC # BLD AUTO: 9.25 K/UL (ref 3.9–12.7)

## 2025-07-23 PROCEDURE — 84484 ASSAY OF TROPONIN QUANT: CPT | Performed by: STUDENT IN AN ORGANIZED HEALTH CARE EDUCATION/TRAINING PROGRAM

## 2025-07-23 PROCEDURE — 85025 COMPLETE CBC W/AUTO DIFF WBC: CPT | Performed by: STUDENT IN AN ORGANIZED HEALTH CARE EDUCATION/TRAINING PROGRAM

## 2025-07-23 PROCEDURE — 99285 EMERGENCY DEPT VISIT HI MDM: CPT | Mod: 25

## 2025-07-23 PROCEDURE — 96374 THER/PROPH/DIAG INJ IV PUSH: CPT

## 2025-07-23 PROCEDURE — 63600175 PHARM REV CODE 636 W HCPCS: Performed by: STUDENT IN AN ORGANIZED HEALTH CARE EDUCATION/TRAINING PROGRAM

## 2025-07-23 PROCEDURE — 83690 ASSAY OF LIPASE: CPT | Performed by: STUDENT IN AN ORGANIZED HEALTH CARE EDUCATION/TRAINING PROGRAM

## 2025-07-23 PROCEDURE — 93010 ELECTROCARDIOGRAM REPORT: CPT | Mod: ,,, | Performed by: INTERNAL MEDICINE

## 2025-07-23 PROCEDURE — 93005 ELECTROCARDIOGRAM TRACING: CPT

## 2025-07-23 PROCEDURE — 80053 COMPREHEN METABOLIC PANEL: CPT | Performed by: STUDENT IN AN ORGANIZED HEALTH CARE EDUCATION/TRAINING PROGRAM

## 2025-07-23 PROCEDURE — 83880 ASSAY OF NATRIURETIC PEPTIDE: CPT | Performed by: STUDENT IN AN ORGANIZED HEALTH CARE EDUCATION/TRAINING PROGRAM

## 2025-07-23 PROCEDURE — 25000003 PHARM REV CODE 250: Performed by: STUDENT IN AN ORGANIZED HEALTH CARE EDUCATION/TRAINING PROGRAM

## 2025-07-23 RX ORDER — SUCRALFATE 1 G/10ML
1 SUSPENSION ORAL
Status: COMPLETED | OUTPATIENT
Start: 2025-07-23 | End: 2025-07-23

## 2025-07-23 RX ORDER — FAMOTIDINE 10 MG/ML
20 INJECTION, SOLUTION INTRAVENOUS
Status: COMPLETED | OUTPATIENT
Start: 2025-07-23 | End: 2025-07-23

## 2025-07-23 RX ADMIN — SUCRALFATE 1 G: 1 SUSPENSION ORAL at 01:07

## 2025-07-23 RX ADMIN — FAMOTIDINE 20 MG: 10 INJECTION, SOLUTION INTRAVENOUS at 01:07

## 2025-07-23 NOTE — ED NOTES
Pt arrive to ED c/o 9/10 epigastric non radiating chest pain x1 hr, reports hx of GERD but has not had any issues in years.

## 2025-07-23 NOTE — ED PROVIDER NOTES
Problem: At Risk for Falls  Goal: Patient does not fall  Outcome: Monitoring/Evaluating progress  Goal: Patient takes action to control fall-related risks  Outcome: Monitoring/Evaluating progress     Problem: At Risk for Injury Due to Fall  Goal: Patient does not fall  Outcome: Monitoring/Evaluating progress  Goal: Takes action to control condition specific risks  Outcome: Monitoring/Evaluating progress  Goal: Verbalizes understanding of fall-related injury personal risks  Description: Document education using the patient education activity  Outcome: Monitoring/Evaluating progress     Problem: Seizures  Goal: Seizure activity controlled  Outcome: Monitoring/Evaluating progress  Goal: Protected from injury if a seizure occurs  Outcome: Monitoring/Evaluating progress  Goal: Verbalizes understanding of seizures and treatment plan  Description: Document education using the patient education activity.   Outcome: Monitoring/Evaluating progress     Problem: Alcohol Withdrawal Management  Goal: # No alcohol-related delirium or seizures  Outcome: Monitoring/Evaluating progress  Goal: # Verbalizes understanding of alcohol withdrawal and health effects of excessive alcohol intake  Description: Documented on patient education activity  Outcome: Monitoring/Evaluating progress     Problem: Pain  Goal: Acceptable pain level achieved/maintained at rest using appropriate pain scale for the patient  Outcome: Monitoring/Evaluating progress  Goal: Acceptable pain level achieved/maintained with activity using appropriate pain scale for the patient  Outcome: Monitoring/Evaluating progress  Goal: Acceptable pain level achieved/maintained without oversedation  Outcome: Monitoring/Evaluating progress     Problem: Adult Mental Health  Goal: Reports or exhibits reduction in symptoms associated with elevated or labile mood/maricel  Outcome: Monitoring/Evaluating progress  Goal: Reports or exhibits improvement in depressive mood  "Encounter Date: 7/23/2025       History     Chief Complaint   Patient presents with    Chest Pain     Pt c/o acute onset 9/10 CP x1 hr. Pt is epigastric in origin and radiates to midsternal area. Pt c/o accompanying pain in her mid back area that is similar to pain in chest. Pt feels like the pain "connects". Pt has hx of GERD, but has not experienced acid reflux in years. Pt reports acute stressors in life due to divorce and states that she had court this morning. Pt denies cardiac hx, but states that her father had heart issues. Pt denies accompanying SOB or any other symptoms.      54-year-old female presents for evaluation of sharp, midepigastric abdominal and substernal chest pain starting about an hour ago.  She awoke to the pain and initially thought it was acid reflux.  The pain then radiated to the back.  She has hx of GERD, but has not experienced acid reflux in years. Pt reports acute stressors in life due to divorce and states that she had court this morning. Pt denies cardiac hx, but states that her father had a myocardial infarction at 86 years old. Pt denies accompanying SOB or any other symptoms.        Review of patient's allergies indicates:  No Known Allergies  History reviewed. No pertinent past medical history.  History reviewed. No pertinent surgical history.  No family history on file.  Social History[1]  Review of Systems   Cardiovascular:  Positive for chest pain.       Physical Exam     Initial Vitals [07/23/25 0105]   BP Pulse Resp Temp SpO2   135/81 73 20 98.2 °F (36.8 °C) 100 %      MAP       --         Physical Exam    Nursing note and vitals reviewed.  Constitutional: She appears well-developed and well-nourished. She is not diaphoretic.   HENT:   Head: Normocephalic and atraumatic. Mouth/Throat: Oropharynx is clear and moist.   Eyes: EOM are normal. Pupils are equal, round, and reactive to light. Right eye exhibits no discharge. Left eye exhibits no discharge.   Neck: No tracheal " deviation present.   Normal range of motion.  Cardiovascular:  Normal rate, regular rhythm and intact distal pulses.           Pulmonary/Chest: No respiratory distress. She has no wheezes. She exhibits no tenderness.   Abdominal: Abdomen is soft. She exhibits no distension. There is no abdominal tenderness.   Musculoskeletal:         General: No tenderness or edema. Normal range of motion.      Cervical back: Normal range of motion.     Neurological: She is alert and oriented to person, place, and time. She has normal strength. No cranial nerve deficit or sensory deficit. GCS eye subscore is 4. GCS verbal subscore is 5. GCS motor subscore is 6.   Skin: Skin is warm and dry. No rash noted.   Psychiatric: She has a normal mood and affect. Her behavior is normal. Thought content normal.         ED Course   Procedures  Labs Reviewed   COMPREHENSIVE METABOLIC PANEL - Abnormal       Result Value    Sodium 142      Potassium 3.6      Chloride 105      CO2 25      Glucose 117 (*)     BUN 14      Creatinine 1.0      Calcium 9.0      Protein Total 8.2      Albumin 3.4 (*)     Bilirubin Total 0.3      ALP 99      AST 39      ALT 38      Anion Gap 12      eGFR >60     CBC WITH DIFFERENTIAL - Abnormal    WBC 9.25      RBC 4.48      HGB 12.5      HCT 39.6      MCV 88      MCH 27.9      MCHC 31.6 (*)     RDW 12.9      Platelet Count 268      MPV 10.4      Nucleated RBC 0      Neut % 45.5      Lymph % 46.2      Mono % 5.3      Eos % 2.3      Basophil % 0.5      Imm Grans % 0.2      Neut # 4.21      Lymph # 4.27      Mono # 0.49      Eos # 0.21      Baso # 0.05      Imm Grans # 0.02     TROPONIN I - Normal    Troponin-I <0.006     B-TYPE NATRIURETIC PEPTIDE - Normal    BNP 10     LIPASE - Normal    Lipase Level 24     CBC W/ AUTO DIFFERENTIAL    Narrative:     The following orders were created for panel order CBC auto differential.  Procedure                               Abnormality         Status                     ---------       signs/symptoms  Outcome: Monitoring/Evaluating progress  Goal: Reports or exhibits an improvement in mood signs/symptoms associated with anxiety  Outcome: Monitoring/Evaluating progress  Goal: Demonstrates ability to proactively perform self cares  Outcome: Monitoring/Evaluating progress  Goal: Demonstrates the ability to engage in reality-based communication and refrains from acting on delusional thoughts  Outcome: Monitoring/Evaluating progress  Goal: Reports or exhibits hallucinations absent or at manageable level  Outcome: Monitoring/Evaluating progress  Goal: Demonstrates improved ability to track conversation, process information  Outcome: Monitoring/Evaluating progress  Goal: Reports decrease in thoughts of suicide  Outcome: Monitoring/Evaluating progress  Goal: Reports decrease in thoughts of violence  Outcome: Monitoring/Evaluating progress  Goal: Reports decrease in thoughts of self harm  Outcome: Monitoring/Evaluating progress  Goal: Verbalizes when symptoms of illness are triggered and reports to staff when experiencing urges/intent of suicide  Outcome: Monitoring/Evaluating progress  Goal: Verbalizes when symptoms of illness are triggered and reports to staff when experiencing urges/intent of violence  Outcome: Monitoring/Evaluating progress  Goal: Verbalizes when symptoms of illness are triggered and reports to staff when experiencing urges/intent of self harm  Outcome: Monitoring/Evaluating progress  Goal: Denies having a suicidal plan  Outcome: Monitoring/Evaluating progress  Goal: Denies having a homicidal plan  Outcome: Monitoring/Evaluating progress  Goal: Verbalizes understanding of positive individual coping skills  Outcome: Monitoring/Evaluating progress  Goal: Demonstrates control of behavior, refraining from hostility, aggression or threats of violence  Outcome: Monitoring/Evaluating progress  Goal: Refrains from self harm behavior/cutting  Outcome: Monitoring/Evaluating progress  Goal:                           -----------         ------                     CBC with Differential[[id number]]       Abnormal            Final result                 Please view results for these tests on the individual orders.   EXTRA TUBES    Narrative:     The following orders were created for panel order EXTRA TUBES.  Procedure                               Abnormality         Status                     ---------                               -----------         ------                     Light Blue Top Hold[[id number]]                             Final result                 Please view results for these tests on the individual orders.   LIGHT BLUE TOP HOLD    Extra Tube Hold for add-ons.     TROPONIN I     EKG Readings: (Independently Interpreted)   EKG with regular rate, regular rhythm, normal axis, no acute ST elevations or depressions, normal WA, QRS and QT interval. Interpreted by me.         Imaging Results              X-Ray Chest AP Portable (Final result)  Result time 07/23/25 01:39:52      Final result by Lane Davey MD (07/23/25 01:39:52)                   Impression:      No acute cardiopulmonary process identified.      Electronically signed by: Lane Davey MD  Date:    07/23/2025  Time:    01:39               Narrative:    EXAMINATION:  XR CHEST AP PORTABLE    CLINICAL HISTORY:  Chest Pain;    TECHNIQUE:  Single frontal view of the chest was performed.    COMPARISON:  September 2023.    FINDINGS:  Cardiac silhouette is normal in size.  Lungs are symmetrically expanded.  No evidence of focal consolidative process, pneumothorax, or significant pleural effusion.  No acute osseous abnormality identified.                                    X-Rays:   Independently Interpreted Readings:   Other Readings:  I reviewed the CXR independently of the radiologist.     Chest x-ray was negative for acute cardiopulmonary abnormalities, infiltrates or bony abnormalities.        Medications   famotidine (PF)  Verbalizes understanding of diagnosis, reason for treament and treatment program  Outcome: Monitoring/Evaluating progress  Goal: Verbalizes understanding of medication management/monitoring/assessment of effectiveness  Outcome: Monitoring/Evaluating progress  Goal: Demonstrates or reports decrease in symptoms of paranoia or delusional thoughts  Outcome: Monitoring/Evaluating progress      injection 20 mg (20 mg Intravenous Given 7/23/25 0132)   sucralfate 100 mg/mL suspension 1 g (1 g Oral Given 7/23/25 0131)     Medical Decision Making  54 year old patient with hx of GERD presents secondary to sharp, substernal chest pain, worse with laying down, improved here with GI cocktail. Vitals normal. Patient well appearing and non-toxic on my exam.     Chest pain less likely to be ACS given EKG without acute ischemic changes, troponin within normal limits x2, lack of risk factors and history less concerning for ACS. Heart score is 1. BNP within normal limits, patient is not volume overloaded on my exam, no pleural effusions on CXR, doubt CHF. Chest xray without infiltrate, patient has no fever and no cough, lessening concern for bacterial pneumonia. Breath sounds equal, respirations unlabored, CXR w/o evidence of pneumothorax. Patient well appearing with normal vital signs, no muffled heart tones, doubt tamponade. No emesis, dyspaghia, no mediastinal air on CXR, doubt esophageal rupture. Doubt pulmonary embolus, given patient is not tachycardic, lack of risk factors for PE, no unilateral leg swelling . Doubt aortic dissection given patient is well appearing with equal pulses bilaterally, no abdominal pain, no peripheral pulse deficit, no new neurological deficit and no tearing chest pain to the back. Unclear etiology of chest pain at this time, but unlikely to be life threatening. No further workup warranted at this time.      Patient felt improved with interventions and is stable for discharge with outpatient follow-up. Return precautions given for new or worsening pain, exertional chest pain, worsening shortness of breath, or other new or worsening symptoms, patient understands and agrees with plan. All questions answered.  Instructed to follow up with PCP.       Amount and/or Complexity of Data Reviewed  Labs: ordered.  Radiology: ordered.    Risk  Prescription drug management.                                           Clinical Impression:  Final diagnoses:  [R07.9] Chest pain                       [1]   Social History  Tobacco Use    Smoking status: Never    Smokeless tobacco: Never   Substance Use Topics    Alcohol use: Never    Drug use: Never        Cb Donahue MD  07/23/25 0417

## 2025-07-26 LAB
OHS QRS DURATION: 90 MS
OHS QTC CALCULATION: 451 MS